# Patient Record
Sex: MALE | Race: WHITE | Employment: FULL TIME | URBAN - METROPOLITAN AREA
[De-identification: names, ages, dates, MRNs, and addresses within clinical notes are randomized per-mention and may not be internally consistent; named-entity substitution may affect disease eponyms.]

---

## 2022-01-24 ENCOUNTER — OFFICE VISIT (OUTPATIENT)
Dept: FAMILY MEDICINE CLINIC | Age: 47
End: 2022-01-24
Payer: COMMERCIAL

## 2022-01-24 ENCOUNTER — HOSPITAL ENCOUNTER (OUTPATIENT)
Dept: LAB | Age: 47
Discharge: HOME | End: 2022-01-24
Payer: COMMERCIAL

## 2022-01-24 VITALS
OXYGEN SATURATION: 95 % | HEART RATE: 86 BPM | WEIGHT: 315 LBS | HEIGHT: 69 IN | DIASTOLIC BLOOD PRESSURE: 79 MMHG | BODY MASS INDEX: 46.65 KG/M2 | RESPIRATION RATE: 20 BRPM | SYSTOLIC BLOOD PRESSURE: 125 MMHG | TEMPERATURE: 98.4 F

## 2022-01-24 DIAGNOSIS — R63.5 WEIGHT GAIN: ICD-10-CM

## 2022-01-24 DIAGNOSIS — G89.29 CHRONIC PAIN OF RIGHT KNEE: ICD-10-CM

## 2022-01-24 DIAGNOSIS — Z13.89 SCREENING FOR HEMATURIA OR PROTEINURIA: ICD-10-CM

## 2022-01-24 DIAGNOSIS — Z23 ENCOUNTER FOR IMMUNIZATION: Primary | ICD-10-CM

## 2022-01-24 DIAGNOSIS — F10.11 HISTORY OF ALCOHOL ABUSE: ICD-10-CM

## 2022-01-24 DIAGNOSIS — I10 HYPERTENSION, UNSPECIFIED TYPE: ICD-10-CM

## 2022-01-24 DIAGNOSIS — Z11.59 ENCOUNTER FOR HEPATITIS C SCREENING TEST FOR LOW RISK PATIENT: ICD-10-CM

## 2022-01-24 DIAGNOSIS — M25.561 CHRONIC PAIN OF RIGHT KNEE: ICD-10-CM

## 2022-01-24 DIAGNOSIS — Z83.3 FAMILY HISTORY OF DIABETES MELLITUS (DM): ICD-10-CM

## 2022-01-24 DIAGNOSIS — R00.2 FLUTTERING SENSATION OF HEART: ICD-10-CM

## 2022-01-24 DIAGNOSIS — E66.01 CLASS 3 SEVERE OBESITY DUE TO EXCESS CALORIES WITH SERIOUS COMORBIDITY AND BODY MASS INDEX (BMI) OF 45.0 TO 49.9 IN ADULT (HCC): ICD-10-CM

## 2022-01-24 DIAGNOSIS — K76.0 FATTY LIVER: ICD-10-CM

## 2022-01-24 DIAGNOSIS — K57.92 DIVERTICULITIS: ICD-10-CM

## 2022-01-24 LAB
ALBUMIN SERPL-MCNC: 4.2 G/DL (ref 3.4–5)
ALBUMIN/GLOB SERPL: 1.2 {RATIO} (ref 0.8–1.7)
ALP SERPL-CCNC: 55 U/L (ref 45–117)
ALT SERPL-CCNC: 37 U/L (ref 16–61)
ANION GAP SERPL CALC-SCNC: 6 MMOL/L (ref 3–18)
APPEARANCE UR: CLEAR
AST SERPL-CCNC: 17 U/L (ref 10–38)
BILIRUB SERPL-MCNC: 1 MG/DL (ref 0.2–1)
BILIRUB UR QL: NEGATIVE
BUN SERPL-MCNC: 12 MG/DL (ref 7–18)
BUN/CREAT SERPL: 13 (ref 12–20)
CALCIUM SERPL-MCNC: 9.1 MG/DL (ref 8.5–10.1)
CHLORIDE SERPL-SCNC: 104 MMOL/L (ref 100–111)
CHOLEST SERPL-MCNC: 141 MG/DL
CO2 SERPL-SCNC: 27 MMOL/L (ref 21–32)
COLOR UR: YELLOW
CREAT SERPL-MCNC: 0.91 MG/DL (ref 0.6–1.3)
EST. AVERAGE GLUCOSE BLD GHB EST-MCNC: 123 MG/DL
GLOBULIN SER CALC-MCNC: 3.6 G/DL (ref 2–4)
GLUCOSE SERPL-MCNC: 94 MG/DL (ref 74–99)
GLUCOSE UR STRIP.AUTO-MCNC: NEGATIVE MG/DL
HBA1C MFR BLD: 5.9 % (ref 4.2–5.6)
HDLC SERPL-MCNC: 39 MG/DL (ref 40–60)
HDLC SERPL: 3.6 {RATIO} (ref 0–5)
HGB UR QL STRIP: NEGATIVE
KETONES UR QL STRIP.AUTO: NEGATIVE MG/DL
LDLC SERPL CALC-MCNC: 85.6 MG/DL (ref 0–100)
LEUKOCYTE ESTERASE UR QL STRIP.AUTO: NEGATIVE
LIPASE SERPL-CCNC: 91 U/L (ref 73–393)
LIPID PROFILE,FLP: ABNORMAL
NITRITE UR QL STRIP.AUTO: NEGATIVE
PH UR STRIP: 6.5 [PH] (ref 5–8)
POTASSIUM SERPL-SCNC: 4.5 MMOL/L (ref 3.5–5.5)
PROT SERPL-MCNC: 7.8 G/DL (ref 6.4–8.2)
PROT UR STRIP-MCNC: NEGATIVE MG/DL
SODIUM SERPL-SCNC: 137 MMOL/L (ref 136–145)
SP GR UR REFRACTOMETRY: 1.01 (ref 1–1.03)
TRIGL SERPL-MCNC: 82 MG/DL (ref ?–150)
UROBILINOGEN UR QL STRIP.AUTO: 0.2 EU/DL (ref 0.2–1)
VLDLC SERPL CALC-MCNC: 16.4 MG/DL

## 2022-01-24 PROCEDURE — 99204 OFFICE O/P NEW MOD 45 MIN: CPT | Performed by: NURSE PRACTITIONER

## 2022-01-24 PROCEDURE — 81003 URINALYSIS AUTO W/O SCOPE: CPT

## 2022-01-24 PROCEDURE — 83036 HEMOGLOBIN GLYCOSYLATED A1C: CPT

## 2022-01-24 PROCEDURE — 93000 ELECTROCARDIOGRAM COMPLETE: CPT | Performed by: NURSE PRACTITIONER

## 2022-01-24 PROCEDURE — 80053 COMPREHEN METABOLIC PANEL: CPT

## 2022-01-24 PROCEDURE — 83690 ASSAY OF LIPASE: CPT

## 2022-01-24 PROCEDURE — 36415 COLL VENOUS BLD VENIPUNCTURE: CPT

## 2022-01-24 PROCEDURE — 86803 HEPATITIS C AB TEST: CPT

## 2022-01-24 PROCEDURE — 80061 LIPID PANEL: CPT

## 2022-01-24 RX ORDER — LANOLIN ALCOHOL/MO/W.PET/CERES
1000 CREAM (GRAM) TOPICAL DAILY
COMMUNITY

## 2022-01-24 RX ORDER — AMLODIPINE BESYLATE AND BENAZEPRIL HYDROCHLORIDE 10; 40 MG/1; MG/1
1 CAPSULE ORAL DAILY
Qty: 90 CAPSULE | Refills: 1 | Status: SHIPPED | OUTPATIENT
Start: 2022-01-24 | End: 2022-08-22 | Stop reason: SDUPTHER

## 2022-01-24 RX ORDER — CHOLECALCIFEROL TAB 125 MCG (5000 UNIT) 125 MCG
5000 TAB ORAL DAILY
COMMUNITY

## 2022-01-24 RX ORDER — VITAMIN E 1000 UNIT
1000 CAPSULE ORAL DAILY
COMMUNITY

## 2022-01-24 RX ORDER — AMLODIPINE BESYLATE AND BENAZEPRIL HYDROCHLORIDE 10; 40 MG/1; MG/1
1 CAPSULE ORAL DAILY
COMMUNITY
End: 2022-01-24 | Stop reason: SDUPTHER

## 2022-01-24 NOTE — PROGRESS NOTES
Patient tested positive for Covid on January 1, 2021. Patient states he took medication today. Skip Parr presents today for   Chief Complaint   Patient presents with   [facility] Establish Care    Knee Pain    Hypertension    Weight Gain       Is someone accompanying this pt? Yes Wife     Is the patient using any DME equipment during [address]? no    Depression Screening:  3 most recent PHQ Screens 1/24/2022   Little interest or pleasure in doing things Not at all   Feeling down, depressed, irritable, or hopeless Not at all   Total Score PHQ 2 0       Learning Assessment:  Learning Assessment 1/24/2022   PRIMARY LEARNER Patient   HIGHEST LEVEL OF EDUCATION - PRIMARY LEARNER  2 YEARS OF COLLEGE   BARRIERS PRIMARY LEARNER NONE   CO-LEARNER CAREGIVER No   PRIMARY LANGUAGE ENGLISH   LEARNER PREFERENCE PRIMARY DEMONSTRATION   ANSWERED BY Patient   RELATIONSHIP SELF       Health Maintenance reviewed and discussed and ordered per Provider. Health Maintenance Due   Topic Date Due    Hepatitis C Screening  Never done    DTaP/Tdap/Td series (1 - Tdap) Never done    Lipid Screen  Never done    COVID-19 Vaccine (2 - Booster for Anival series) 07/02/2021    Flu Vaccine (1) Never done   . Coordination of Care:  1. Have you been to the ER, urgent care clinic since your last visit? Hospitalized since your last visit? Yes December 9, 2021 Diverticulitis     2. Have you seen or consulted any other health care providers outside of the [address] [place] [address] since your last visit? Include any pap smears or colon screening.  no

## 2022-01-24 NOTE — PROGRESS NOTES
The Lizeth Solitario 42 y.o. male presents today for:    Chief Complaint   Patient presents with    Establish Care    Knee Pain    Hypertension    Weight Gain   Works as a . [employer]-right knee replacement    Pt states has a history of tachyarrhythmias; complains of intermittent fluttering in chest.   Pt has dependent edema. C/o right knee chronic pain s/p knee replacement. Left heel pain from plantar fascitis. Pt stopped drinking alcohol after recent ER visit for diverticulitis on 12-9; was drinking 15-20 beers on days off for the past several years. Patient had recent covid-19 infection on 1/1/2022. Review of Systems   Constitutional: Negative for chills, fever and weight loss. HENT: Negative. Eyes: Negative. Negative for blurred vision and double vision. Respiratory: Positive for cough. Negative for shortness of breath and wheezing. Cardiovascular: Positive for palpitations and leg swelling. Negative for chest pain. Fluttering    Dependent edema   Gastrointestinal: Positive for abdominal pain. Negative for blood in stool, constipation and diarrhea. Genitourinary: Negative. Negative for frequency and urgency. Musculoskeletal: Positive for joint pain. Negative for falls. Right knee pain    Left plantar fascitis    Skin: Negative. Neurological: Negative. Negative for dizziness and headaches. Endo/Heme/Allergies: Negative. Psychiatric/Behavioral: Positive for substance abuse. Negative for depression and suicidal ideas. 12/9-stopped alcohol consumption       Health Maintenance Due   Topic Date Due    COVID-19 Vaccine (2 - Booster for Anival series) 07/02/2021        Past Medical History:   Diagnosis Date    Hypertension        Physical Exam  Constitutional:       General: He is not in acute distress. Appearance: He is obese. He is not toxic-appearing. HENT:      Head: Normocephalic.    Cardiovascular:      Rate and Rhythm: Normal rate and regular rhythm. Pulses: Normal pulses. Heart sounds: Normal heart sounds. No murmur heard. Pulmonary:      Effort: Pulmonary effort is normal. No respiratory distress. Breath sounds: Normal breath sounds. No wheezing. Abdominal:      General: There is distension. Palpations: Abdomen is soft. There is no mass. Tenderness: There is no abdominal tenderness. There is no guarding or rebound. Hernia: No hernia is present. Musculoskeletal:      Cervical back: Normal range of motion. Right lower leg: Edema present. Left lower leg: No edema. Skin:     General: Skin is warm. Capillary Refill: Capillary refill takes less than 2 seconds. Findings: No rash. Neurological:      General: No focal deficit present. Mental Status: He is alert and oriented to person, place, and time. Cranial Nerves: No cranial nerve deficit. Motor: No weakness. Psychiatric:         Mood and Affect: Mood normal.        Visit Vitals  /79 (BP 1 Location: Left upper arm, BP Patient Position: Sitting)   Pulse 86   Temp 98.4 °F (36.9 °C) (Temporal)   Resp 20   Ht 5' 9\" (1.753 m)   Wt 336 lb 3.2 oz (152.5 kg)   SpO2 95%   BMI 49.65 kg/m²       Current Outpatient Medications:     cholecalciferol (Vitamin D3) (5000 Units/125 mcg) tab tablet, Take 5,000 Units by mouth daily. , Disp: , Rfl:     ascorbic acid, vitamin C, (Vitamin C) 1,000 mg tablet, Take 1,000 mg by mouth daily. , Disp: , Rfl:     MAGNESIUM GLYCINATE PO, Take 400 mg by mouth two (2) times a day., Disp: , Rfl:     cyanocobalamin (Vitamin B-12) 1,000 mcg tablet, Take 1,000 mcg by mouth daily. , Disp: , Rfl:     KRILL OIL PO, Take 1,000 mg by mouth daily. , Disp: , Rfl:     amLODIPine-benazepril (LOTREL) 10-40 mg per capsule, Take 1 Capsule by mouth daily. , Disp: 90 Capsule, Rfl: 1  ASSESSMENT and PLAN    ICD-10-CM ICD-9-CM    1.  Encounter for immunization  Z23 V03.89 THER/PROPH/DIAG INJECTION, SUBCUT/IM      CANCELED: INFLUENZA VIRUS VAC QUAD,SPLIT,PRESV FREE SYRINGE IM   2. Hypertension, unspecified type  T59 708.3 METABOLIC PANEL, COMPREHENSIVE   3. Weight gain  R63.5 783.1 LIPID PANEL   4. Fatty liver  K76.0 571.8    5. Diverticulitis  K57.92 562.11    6. Chronic pain of right knee  M25.561 719.46     G89.29 338.29    7. Family history of diabetes mellitus (DM)  Z83.3 V18.0    8. Class 3 severe obesity due to excess calories with serious comorbidity and body mass index (BMI) of 45.0 to 49.9 in adult (HCC)  E66.01 278.01 HEMOGLOBIN A1C WITH EAG    Z68.42 V85.42    9. Encounter for hepatitis C screening test for low risk patient  Z11.59 V73.89 HEPATITIS C AB   10. History of alcohol abuse  F10.11 305.03 LIPASE   11. Fluttering sensation of heart  R00.2 785.1 AMB POC EKG ROUTINE W/ 12 LEADS, INTER & REP      REFERRAL TO CARDIOLOGY   12. Screening for hematuria or proteinuria  Z13.89 V82.9 URINALYSIS W/ RFLX MICROSCOPIC     Follow-up and Dispositions    · Return in about 1 month (around 2/24/2022).        EKG normal. Pt requesting cardiology referral for cardiac stress test, possible event monitor for fluttering in chest.    lab results and schedule of future lab studies reviewed with patient  reviewed diet, exercise and weight control  cardiovascular risk and specific lipid/LDL goals reviewed  reviewed medications and side effects in detail    Nguyễn Ruiz NP

## 2022-01-25 LAB
HCV AB SER IA-ACNC: 0.05 INDEX
HCV AB SERPL QL IA: NEGATIVE
HCV COMMENT,HCGAC: NORMAL

## 2022-02-04 ENCOUNTER — TELEPHONE (OUTPATIENT)
Dept: FAMILY MEDICINE CLINIC | Age: 47
End: 2022-02-04

## 2022-02-04 DIAGNOSIS — R00.2 FLUTTERING SENSATION OF HEART: Primary | ICD-10-CM

## 2022-02-28 ENCOUNTER — OFFICE VISIT (OUTPATIENT)
Dept: FAMILY MEDICINE CLINIC | Age: 47
End: 2022-02-28
Payer: COMMERCIAL

## 2022-02-28 VITALS
TEMPERATURE: 98.5 F | BODY MASS INDEX: 46.65 KG/M2 | RESPIRATION RATE: 20 BRPM | OXYGEN SATURATION: 96 % | HEART RATE: 84 BPM | SYSTOLIC BLOOD PRESSURE: 130 MMHG | WEIGHT: 315 LBS | HEIGHT: 69 IN | DIASTOLIC BLOOD PRESSURE: 71 MMHG

## 2022-02-28 DIAGNOSIS — E66.01 CLASS 3 SEVERE OBESITY DUE TO EXCESS CALORIES WITH SERIOUS COMORBIDITY AND BODY MASS INDEX (BMI) OF 45.0 TO 49.9 IN ADULT (HCC): ICD-10-CM

## 2022-02-28 DIAGNOSIS — K57.92 DIVERTICULITIS: ICD-10-CM

## 2022-02-28 DIAGNOSIS — K76.0 FATTY LIVER: ICD-10-CM

## 2022-02-28 DIAGNOSIS — F10.11 HISTORY OF ALCOHOL ABUSE: ICD-10-CM

## 2022-02-28 DIAGNOSIS — R63.5 WEIGHT GAIN: Primary | ICD-10-CM

## 2022-02-28 DIAGNOSIS — R73.03 PREDIABETES: ICD-10-CM

## 2022-02-28 PROCEDURE — 99214 OFFICE O/P EST MOD 30 MIN: CPT | Performed by: NURSE PRACTITIONER

## 2022-02-28 NOTE — PROGRESS NOTES
The Skip Parr 42 y.o. male presents today for:    Chief Complaint   Patient presents with    Follow-up     1 month     Hypertension     Saw GI doctor; additional labs ordered. Reviewed labs in detail with patient. Pt denies any further palpitations since last visit. Colonoscopy scheduled for tomorrow    Liver scan at end of March     Stress test 3/31    Review of Systems   Constitutional: Negative. Negative for chills, fever and weight loss. HENT: Negative. Eyes: Negative. Negative for blurred vision and double vision. Respiratory: Negative for cough, shortness of breath and wheezing. Cardiovascular: Negative for chest pain, palpitations and leg swelling. Gastrointestinal: Positive for abdominal pain. Negative for blood in stool, constipation and diarrhea. Genitourinary: Negative. Negative for frequency and urgency. Musculoskeletal: Positive for joint pain. Negative for falls. Right knee pain    Left plantar fascitis    Skin: Negative. Neurological: Negative. Negative for dizziness and headaches. Endo/Heme/Allergies: Negative. Psychiatric/Behavioral: Negative for depression, substance abuse and suicidal ideas. 12/9-stopped alcohol consumption     Visit Vitals  /71 (BP 1 Location: Left upper arm, BP Patient Position: Sitting)   Pulse 84   Temp 98.5 °F (36.9 °C) (Temporal)   Resp 20   Ht 5' 9\" (1.753 m)   Wt 341 lb (154.7 kg)   SpO2 96%   BMI 50.36 kg/m²       Current Outpatient Medications:     cholecalciferol (Vitamin D3) (5000 Units/125 mcg) tab tablet, Take 5,000 Units by mouth daily. , Disp: , Rfl:     ascorbic acid, vitamin C, (Vitamin C) 1,000 mg tablet, Take 1,000 mg by mouth daily. , Disp: , Rfl:     MAGNESIUM GLYCINATE PO, Take 400 mg by mouth two (2) times a day., Disp: , Rfl:     cyanocobalamin (Vitamin B-12) 1,000 mcg tablet, Take 1,000 mcg by mouth daily. , Disp: , Rfl:     KRILL OIL PO, Take 1,000 mg by mouth daily. , Disp: , Rfl:    amLODIPine-benazepril (LOTREL) 10-40 mg per capsule, Take 1 Capsule by mouth daily. , Disp: 90 Capsule, Rfl: 1  Health Maintenance Due   Topic Date Due    Pneumococcal 0-64 years (1 of 2 - PPSV23) Never done        Past Medical History:   Diagnosis Date    Hypertension        Physical Exam  Constitutional:       General: He is not in acute distress. Appearance: He is obese. He is not toxic-appearing. Cardiovascular:      Rate and Rhythm: Normal rate and regular rhythm. Pulses: Normal pulses. Heart sounds: Normal heart sounds. No murmur heard. Pulmonary:      Effort: Pulmonary effort is normal.      Breath sounds: Normal breath sounds. Abdominal:      Palpations: Abdomen is soft. Musculoskeletal:         General: Normal range of motion. Skin:     General: Skin is warm. Capillary Refill: Capillary refill takes less than 2 seconds. Neurological:      General: No focal deficit present. Mental Status: He is alert and oriented to person, place, and time. Psychiatric:         Mood and Affect: Mood normal.        Visit Vitals  /71 (BP 1 Location: Left upper arm, BP Patient Position: Sitting)   Pulse 84   Temp 98.5 °F (36.9 °C) (Temporal)   Resp 20   Ht 5' 9\" (1.753 m)   Wt 341 lb (154.7 kg)   SpO2 96%   BMI 50.36 kg/m²       Current Outpatient Medications:     cholecalciferol (Vitamin D3) (5000 Units/125 mcg) tab tablet, Take 5,000 Units by mouth daily. , Disp: , Rfl:     ascorbic acid, vitamin C, (Vitamin C) 1,000 mg tablet, Take 1,000 mg by mouth daily. , Disp: , Rfl:     MAGNESIUM GLYCINATE PO, Take 400 mg by mouth two (2) times a day., Disp: , Rfl:     cyanocobalamin (Vitamin B-12) 1,000 mcg tablet, Take 1,000 mcg by mouth daily. , Disp: , Rfl:     KRILL OIL PO, Take 1,000 mg by mouth daily. , Disp: , Rfl:     amLODIPine-benazepril (LOTREL) 10-40 mg per capsule, Take 1 Capsule by mouth daily. , Disp: 90 Capsule, Rfl: 1       ASSESSMENT and PLAN    ICD-10-CM ICD-9-CM    1. Weight gain  R63.5 783.1 REFERRAL TO DIETITIAN   2. Fatty liver  K76.0 571.8 REFERRAL TO DIETITIAN   3. Class 3 severe obesity due to excess calories with serious comorbidity and body mass index (BMI) of 45.0 to 49.9 in adult (HCC)  E66.01 278.01     Z68.42 V85.42    4. Diverticulitis  K57.92 562.11 REFERRAL TO DIETITIAN   5. Prediabetes  R73.03 790.29    6. History of alcohol abuse  F10.11 305.03      Follow-up and Dispositions    · Return in about 3 months (around 5/28/2022).        lab results and schedule of future lab studies reviewed with patient  reviewed diet, exercise and weight control  cardiovascular risk and specific lipid/LDL goals reviewed  reviewed medications and side effects in detail    Yoshi Salamanca NP

## 2022-02-28 NOTE — PROGRESS NOTES
Patient states he took medication today. Raul Preciado presents today for   Chief Complaint   Patient presents with    Follow-up     1 month     Hypertension       Is someone accompanying this pt? Yes Wife     Is the patient using any DME equipment during OV? No     Depression Screening:  3 most recent PHQ Screens 2/28/2022   Little interest or pleasure in doing things Not at all   Feeling down, depressed, irritable, or hopeless Not at all   Total Score PHQ 2 0       Learning Assessment:  Learning Assessment 1/24/2022   PRIMARY LEARNER Patient   HIGHEST LEVEL OF EDUCATION - PRIMARY LEARNER  2 YEARS OF COLLEGE   BARRIERS PRIMARY LEARNER NONE   CO-LEARNER CAREGIVER No   PRIMARY LANGUAGE ENGLISH   LEARNER PREFERENCE PRIMARY DEMONSTRATION   ANSWERED BY Patient   RELATIONSHIP SELF       Health Maintenance reviewed and discussed and ordered per Provider. Health Maintenance Due   Topic Date Due    Pneumococcal 0-64 years (1 of 2 - PPSV23) Never done   . Coordination of Care:  1. Have you been to the ER, urgent care clinic since your last visit? Hospitalized since your last visit? no    2. Have you seen or consulted any other health care providers outside of the [address] [place] [address] since your last visit? Include any pap smears or colon screening. Yes GI February 2022.          3. For patients aged 39-70: Has the patient had a colonoscopy / FIT/ Cologuard? NA - based on age      If the patient is female:    4. For patients aged 41-77: Has the patient had a mammogram within the past 2 years? NA - based on age or sex      11. For patients aged 21-65: Has the patient had a pap smear?  NA - based on age or sex

## 2022-03-22 ENCOUNTER — HOSPITAL ENCOUNTER (OUTPATIENT)
Dept: LAB | Age: 47
Discharge: HOME | End: 2022-03-22
Payer: COMMERCIAL

## 2022-03-22 ENCOUNTER — OFFICE VISIT (OUTPATIENT)
Dept: CARDIOLOGY CLINIC | Age: 47
End: 2022-03-22
Payer: COMMERCIAL

## 2022-03-22 VITALS
HEART RATE: 90 BPM | TEMPERATURE: 98.1 F | WEIGHT: 315 LBS | DIASTOLIC BLOOD PRESSURE: 69 MMHG | OXYGEN SATURATION: 97 % | SYSTOLIC BLOOD PRESSURE: 129 MMHG | BODY MASS INDEX: 49.18 KG/M2 | RESPIRATION RATE: 16 BRPM

## 2022-03-22 DIAGNOSIS — R00.2 PALPITATIONS: Primary | ICD-10-CM

## 2022-03-22 DIAGNOSIS — R00.2 PALPITATIONS: ICD-10-CM

## 2022-03-22 LAB — TSH SERPL DL<=0.05 MIU/L-ACNC: 1.22 UIU/ML (ref 0.36–3.74)

## 2022-03-22 PROCEDURE — 36415 COLL VENOUS BLD VENIPUNCTURE: CPT

## 2022-03-22 PROCEDURE — 84443 ASSAY THYROID STIM HORMONE: CPT

## 2022-03-22 PROCEDURE — 99204 OFFICE O/P NEW MOD 45 MIN: CPT | Performed by: INTERNAL MEDICINE

## 2022-03-22 NOTE — PROGRESS NOTES
Identified pt with two pt identifiers(name and ). Reviewed record in preparation for visit and have obtained necessary documentation. Ovi Hahn presents today for   Chief Complaint   Patient presents with    New Patient     flutters       Pt denies DIZZINESS, SOB, CHEST PAIN/ PRESSURE, FATIGUE/WEAKNESS, HEADACHES, SWELLING. Ovi Hahn preferred language for health care discussion is english/other. Personal Protective Equipment:   Personal Protective Equipment was used including: mask-surgical and hands-gloves. Patient was placed on no precaution(s). Patient was masked. Precautions:   Patient currently on None  Patient currently roomed with door closed. Is someone accompanying this pt? no    Is the patient using any DME equipment during [address]? no    Depression Screening:  3 most recent PHQ Screens 3/22/2022   Little interest or pleasure in doing things Not at all   Feeling down, depressed, irritable, or hopeless Not at all   Total Score PHQ 2 0       Learning Assessment:  Learning Assessment 2022   PRIMARY LEARNER Patient   HIGHEST LEVEL OF EDUCATION - PRIMARY LEARNER  2 YEARS [facility] PRIMARY LEARNER NONE   CO-LEARNER CAREGIVER No   PRIMARY LANGUAGE ENGLISH   LEARNER PREFERENCE PRIMARY DEMONSTRATION   ANSWERED BY Patient   RELATIONSHIP SELF       Abuse Screening:  Abuse Screening Questionnaire 2022   Do you ever feel afraid of your partner? N   Are you in a relationship with someone who physically or mentally threatens you? N   Is it safe for you to go home? Y       Fall Risk  No flowsheet data found. Pt currently taking Anticoagulant therapy? No  Pt currently taking Antiplatelet therapy? no  Coordination of Care:  1. Have you been to the ER, urgent care clinic since your last visit? Hospitalized since your last visit? no    2. Have you seen or consulted any other health care providers outside of the [address] [place] [address] [place] since your last visit?  Include any pap smears or colon screening. no      Please see Red banners under Allergies and Med Rec to remove outside inquires. All correct information has been verified with patient and added to chart.      Medication's patient's would liked removed has been marked not taking to be removed per Verbal order and read back per Eugenio Lowe MD

## 2022-03-22 NOTE — PROGRESS NOTES
[facility]    81-year-old male with history of hypertension, obesity    Patient is here today for cardiac evaluation. He denies any prior history of MI or CHF. Over last 12 months patient has been having random episode of fluttering sensation in the chest.  This last usually less than 30 seconds. He is still very active and does not complain of any chest pain or chest tightness with exertion. No nausea vomiting or diaphoresis. Occasionally he feels dizzy with this fluttering sensation. He perform exercise regularly for his work. He swim 1000 m without any symptoms. He can ride bike 5 to 6 miles without any symptoms. Denies any lower extremity swelling or PND  Denies any nausea, vomiting, abdominal pain, fever, chills, sputum production. No hematuria or other bleeding complaints    Past Medical History:   Diagnosis Date    Diverticulitis     Hypertension     Kidney stone     Obesity        Review of Systems:  Cardiac symptoms as noted above in HPI. All others negative. Denies fatigue, malaise, skin rash, joint pain, blurring vision, photophobia, neck pain, hemoptysis, chronic cough, nausea, vomiting, hematuria, burning micturition, BRBPR, chronic headaches. Current Outpatient Medications   Medication Sig    MAGNESIUM GLYCINATE PO Take 400 mg by mouth two (2) times a day.  KRILL OIL PO Take 1,000 mg by mouth daily.  amLODIPine-benazepril (LOTREL) 10-40 mg per capsule Take 1 Capsule by mouth daily.  cholecalciferol (Vitamin D3) (5000 Units/125 mcg) tab tablet Take 5,000 Units by mouth daily.  ascorbic acid, vitamin C, (Vitamin C) 1,000 mg tablet Take 1,000 mg by mouth daily.  cyanocobalamin (Vitamin B-12) 1,000 mcg tablet Take 1,000 mcg by mouth daily. No current facility-administered medications for this visit.        Past Surgical History:   Procedure Laterality Date    HX ACL RECONSTRUCTION  1995    HX LITHOTRIPSY      HX SHOULDER ARTHROSCOPY  2007       Allergies and Sensitivities:  Allergies   Allergen Reactions    Egg Hives       Family History:  Family History   Problem Relation Age of Onset    Thyroid Disease Mother        Social History:  Social History     Tobacco Use    Smoking status: Never Smoker    Smokeless tobacco: Never Used   Vaping Use    Vaping Use: Never used   Substance Use Topics    Alcohol use: Yes    Drug use: Not Currently     He  reports that he has never smoked. He has never used smokeless tobacco.  He  reports current alcohol use. Physical Exam:  BP Readings from Last 3 Encounters:   03/22/22 129/69   02/28/22 130/71   01/24/22 125/79         Pulse Readings from Last 3 Encounters:   03/22/22 90   02/28/22 84   01/24/22 86          Wt Readings from Last 3 Encounters:   03/22/22 151 kg (333 lb)   02/28/22 154.7 kg (341 lb)   01/24/22 152.5 kg (336 lb 3.2 oz)       Constitutional: Oriented to person, place, and time. HENT: Head: Normocephalic and atraumatic. Eyes: Conjunctivae and extraocular motions are normal.   Neck: No JVD present. Carotid bruit is not appreciated. Cardiovascular: Regular rhythm. No murmur, gallop or rubs appreciated  Lung: Breath sounds normal. No respiratory distress. No ronchi or rales appreciated  Abdominal: No tenderness. No rebound and no guarding. Musculoskeletal: There is no lower extremity edema. No cynosis  Lymphadenopathy:  No cervical or supraclavicular adenopathy appriciated. Neurological: No gross motor deficit noted. Skin: No visible skin rash noted. No Ear discharge noted  Psychiatric: Normal mood and affect.      LABS:   @No results found for: WBC, WBCLT, HGBPOC, HGB, HGBP, HCTPOC, HCT, PHCT, RBCH, PLT, MCV, HGBEXT, HCTEXT, PLTEXT  Lab Results   Component Value Date/Time    Sodium 137 01/24/2022 10:07 AM    Potassium 4.5 01/24/2022 10:07 AM    Chloride 104 01/24/2022 10:07 AM    CO2 27 01/24/2022 10:07 AM    Glucose 94 01/24/2022 10:07 AM    BUN 12 2022 10:07 AM    Creatinine 0.91 2022 10:07 AM     Lipids Latest Ref Rng & Units 2022   Chol, Total <200 MG/   HDL 40 - 60 MG/DL 39(L)   LDL 0 - 100 MG/DL 85.6   Trig <150 MG/DL 82   Chol/HDL Ratio 0 - 5.0   3.6   Some recent data might be hidden     Lab Results   Component Value Date/Time    ALT (SGPT) 37 2022 10:07 AM     Lab Results   Component Value Date/Time    Hemoglobin A1c 5.9 (H) 2022 10:07 AM     No results found for: TSH, TSH2, TSH3, TSHP, TSHEXT    EK2022: Sinus rhythm at 83 bpm.  No ST changes of ischemia. Normal WV and QRS interval    STRESS TEST (EST, PHARM, NUC, ECHO etc)    CATHETERIZATION    IMPRESSION & PLAN:  81-year-old male with hypertension obesity    Hypertension:  Currently on amlodipine and benazepril. /69. Echo ordered to rule out hypertensive cardiovascular heart disease especially in the setting of some palpitation and atypical chest pain    Palpitation/chest discomfort:  Based on history, seems like patient is having fluttering and palpitation. Do not suspect angina. He is very active and able to perform exercise without any chest pain or angina symptoms  Event monitor for 30 days. We will check TSH  We will proceed with echo to rule out any hypertensive cardiovascular heart disease or to abnormal cardiac structure    Importance of diet and exercise was discussed with patient. This plan was discussed with patient who is in agreement. Thank you for allowing me to participate in patient care. Please feel free to call me if you have any question or concern. Sae Hodge MD  Please note: This document has been produced using voice recognition software. Unrecognized errors in transcription may be present.

## 2022-03-22 NOTE — LETTER
3/22/2022    Patient: Keshav Xiong   YOB: 1975   Date of Visit: 3/22/2022     Stephanie Forman, [address] [place] [id number]  [address] [place]    Dear Stephanie Forman, NP,      Thank you for referring Mr. Keshav Xiong to [address] [place] [address] for evaluation. My notes for this consultation are attached. If you have questions, please do not hesitate to call me. I look forward to following your patient along with you.       Sincerely,    Cyndi Rodriges MD

## 2022-03-22 NOTE — PATIENT INSTRUCTIONS
Testing   Echo     call office 3-5 days after testing is completed for results**     Labs  TSH      No appointment required for lab work  [address] [facility] [address] [facility]  Hours are Mon-Fri 7:00 am-3:30 pm  **closed from 12:30 pm-1pm daily**    Other testing  [employer]-will be calling you to confirm your address to send your Heart Monitor. Please allow 7-10 business days for monitor to arrive at your home.   Customer Service for [address]:  [phone]

## 2022-03-31 ENCOUNTER — TELEPHONE (OUTPATIENT)
Dept: FAMILY MEDICINE CLINIC | Age: 47
End: 2022-03-31

## 2022-03-31 ENCOUNTER — HOSPITAL ENCOUNTER (OUTPATIENT)
Dept: NUTRITION | Age: 47
Discharge: HOME | End: 2022-03-31
Payer: COMMERCIAL

## 2022-03-31 PROCEDURE — 97802 MEDICAL NUTRITION INDIV IN: CPT

## 2022-03-31 NOTE — TELEPHONE ENCOUNTER
Spoke to JUAN) at [facility], she states patient nuclear stress test came back abnormal. She will fax over results.

## 2022-04-08 ENCOUNTER — PATIENT MESSAGE (OUTPATIENT)
Dept: FAMILY MEDICINE CLINIC | Age: 47
End: 2022-04-08

## 2022-04-11 ENCOUNTER — TELEPHONE (OUTPATIENT)
Dept: FAMILY MEDICINE CLINIC | Age: 47
End: 2022-04-11

## 2022-04-11 PROBLEM — M25.562 PAIN IN LEFT KNEE: Status: ACTIVE | Noted: 2019-02-04

## 2022-04-11 PROBLEM — M66.821: Status: ACTIVE | Noted: 2019-02-04

## 2022-04-11 NOTE — TELEPHONE ENCOUNTER
Left voicemail regarding abnormal stress test results. Notified Dr. Macy Stevens via Epic messaging regarding abnormal stress test results as well.

## 2022-04-11 NOTE — TELEPHONE ENCOUNTER
Patient returned call to discuss stress test results
Spoke with patient and wife regarding abnormal stress test results. Pt is wearing an event monitor by cardiology; has echocardiogram scheduled next week and follow up with Dr. Bismark Patricio on 4-20. Discussed that the abnormal result message was sent to Dr. Bismark Patricio as well. Pt is doing better since his micro perforation from diverticulitis. Pt will follow up with GI; had recent normal colonoscopy 4 weeks prior.
no

## 2022-04-19 ENCOUNTER — TELEPHONE (OUTPATIENT)
Dept: CARDIOLOGY CLINIC | Age: 47
End: 2022-04-19

## 2022-04-19 LAB — SARS-COV-2, NAA: NOT DETECTED

## 2022-04-19 NOTE — PROGRESS NOTES
[facility] AT [place] - Outpatient Nutrition Services  [address] [place] [address] Scott Greenberg [address], [address] \[facility]\[address]  Phone: [phone] Fax: [phone]   Nutrition Assessment - Medical Nutrition Therapy   Outpatient Initial Evaluation         Patient Name: Starla Mendez : 1975   Treatment Diagnosis: Weight gain; fatty liver; diverticulitis   Referral Source: Coy Rhodes NP Start of Care [facility]): 2022     Gender: male Age: 42 y.o. Ht: 68.5 In Wt: 324 lb  kg   BMI: 48.5 BMR   Male 2342 BMR Female      Past Medical History:  HTN     Pertinent Medications:   n/a     Biochemical Data:   Lab Results   Component Value Date/Time    Hemoglobin A1c 5.9 (H) 2022 10:07 AM     Lab Results   Component Value Date/Time    Sodium 137 2022 10:07 AM    Potassium 4.5 2022 10:07 AM    Chloride 104 2022 10:07 AM    CO2 27 2022 10:07 AM    Anion gap 6 2022 10:07 AM    Glucose 94 2022 10:07 AM    BUN 12 2022 10:07 AM    Creatinine 0.91 2022 10:07 AM    BUN/Creatinine ratio 13 2022 10:07 AM    GFR est AA >60 2022 10:07 AM    GFR est non-AA >60 2022 10:07 AM    Calcium 9.1 2022 10:07 AM    Bilirubin, total 1.0 2022 10:07 AM    Alk. phosphatase 55 2022 10:07 AM    Protein, total 7.8 2022 10:07 AM    Albumin 4.2 2022 10:07 AM    Globulin 3.6 2022 10:07 AM    A-G Ratio 1.2 2022 10:07 AM    ALT (SGPT) 37 2022 10:07 AM    AST (SGOT) 17 2022 10:07 AM     Lab Results   Component Value Date/Time    Cholesterol, total 141 2022 10:07 AM    HDL Cholesterol 39 (L) 2022 10:07 AM    LDL, calculated 85.6 2022 10:07 AM    VLDL, calculated 16.4 2022 10:07 AM    Triglyceride 82 2022 10:07 AM    CHOL/HDL Ratio 3.6 2022 10:07 AM     Lab Results   Component Value Date/Time    ALT (SGPT) 37 2022 10:07 AM    AST (SGOT) 17 2022 10:07 AM    Alk.  phosphatase 55 2022 10:07 AM Bilirubin, total 1.0 01/24/2022 10:07 AM     Lab Results   Component Value Date/Time    Creatinine 0.91 01/24/2022 10:07 AM     Lab Results   Component Value Date/Time    BUN 12 01/24/2022 10:07 AM     No results found for: MCACR, MCA1, MCA2, MCA3, MCAU, MCAU2, MCALPOCT     Assessment:    Pt referred to nutrition counseling for assistance with weight gain, fatty liver and diverticulitis. Pt accompanied by his wife, who also requested nutrition counseling. Pt reports 80 lb weight gain since he joined [employer] in 2000. Pt has lost almost 20 lbs on his own. Pt c/o joint pain, which limits his ability to exercise, but pt reports activity (outside of job) 1-4d/wk. Pt planning to retire from [employer] in 4 years. Pt highly motivated to improve his health and wellness and agreed to all goals and recommendations provided.  lbs (to begin). Food & Nutrition: B- 2 pkts oatmeal or cream of wheat  L- leftovers or salad with pork/ckn  D- meat + starch + veg  Sn- keto bar or oat yogurt + pretzels    Pt does not drink alcohol (as of Dec 4) and admitted he abused alcohol as a pain reliever for 20 years. Pt's current diet low in protein and inconsistent in both meal timing and macronutrient composition. Estimate Needs   Calories: 1900  Protein: 143 Carbs: 190 Fat: 63   Kcal/day  g/day  g/day  g/day        percent: 30  40  30               Nutrition Diagnosis Nutrition Diagnosis: Obesity related to increased PO intake and lack of activity as evidenced by high BMI and unhealthy diet and exercise recall. Nutrition Intervention &  Education: Provided macronutrient education, including optimal times to eat throughout the day and appropriate balance of macronutrients to promote more efficient RMR. Discussed the importance of developing a consistent lifestyle, including eating habits for long term weight loss and management. Provided pt with meal builder and diet plan.      Handouts Provided: []  Carbohydrates  [] Protein  []  Non-starchy Vegatbles  []  Food Label  []  Meal and Snack Ideas  []  Food Journals []  Diabetes  []  Cholesterol  []  Sodium  [x]  Meal Builder  [x]  Diet Plan  []  Others:   Information Reviewed with: Pt and pt's wife   Readiness to Change Stage: []  Pre-contemplative    []  Contemplative  [x]  Preparation               []  Action                  []  Maintenance   Potential Barriers to Learning: []  Decline in memory    []  Language barrier   []  Other:  []  Emotional                  []  Limited mobility  []  Lack of motivation     [] Vision, hearing or cognitive impairment   Expected Compliance: Good      Nutritional Goal - To promote lifestyle changes to result in:    [x]  Weight loss  []  Improved diabetic control  []  Decreased cholesterol levels  []  Decreased blood pressure  []  Weight maintenance []  Preventing any interactions associated with food allergies  []  Adequate weight gain toward goal weight  []  Other:        Patient Goals:   1. Balance carb and protein at every meal/snack  2.  Eat 2-3 hrs after snacks and 4-5 hrs after meals     Dietitian Signature: Lizy Ruff MS, RD Date: 4/19/2022   Follow-up: 4/28 Time: 11:42 AM

## 2022-04-28 ENCOUNTER — HOSPITAL ENCOUNTER (OUTPATIENT)
Dept: NUTRITION | Age: 47
Discharge: HOME | End: 2022-04-28
Payer: COMMERCIAL

## 2022-04-28 PROCEDURE — 97803 MED NUTRITION INDIV SUBSEQ: CPT

## 2022-05-03 NOTE — PROGRESS NOTES
NUTRITION  FOLLOW-UP TREATMENT NOTE  Patient Name: Alejandro Calderon         Date: 5/3/2022  : 1975    YES/NO Patient  Verified  Diagnosis: weight gain   In time:   200             Out time:   230   Total Treatment Time (min):   30     SUBJECTIVE/ASSESSMENT    Current Wt: 316 Previous Wt: 324 Wt Change: -8     Initial Wt:  Total Wt change:        Changes in medication or medical history? Any new allergies, surgeries or procedures? YES/   If yes, update Summary List   Pt suffered perforated colon and was hospitalized 5d. Pt fully recovered and to f/u with surgeon to discuss resection. Nutrition Diagnosis        Diagnosis Status: Nutrition Diagnosis: Obesity related to increased PO intake and lack of activity as evidenced by high BMI and unhealthy diet and exercise recall. [x]  Improved []  No Change    []  Declined        Nutrition Monitoring and Evaluation: Pt has not had the opportunity to follow prescribed meal plan R/T hospitalization for perforated colon a few days after initial nutrition consult. Pt concerned about nutrition guidelines for managing diverticulitis, but was told by GI that perforation will likely occur again. Discussed general nutrition guidelines with pt for managing diet during inflammatory occasions as well as maintenance. Pt prefers to discuss surgical options with GI surgeon to prevent another infection leading to perforation. Patient Goals 1. Limit to 40-50g total carb at each meal  2.  During inflammation: follow low residue diet (low fiber); maintenance: normal/high fiber diet       PLAN    [x]  Continue on current plan []  Follow-up PRN   []  Discharge due to :    [x]  Next appt: PRN     Dietitian: Jennings Peabody, MS, RD    Date: 5/3/2022 Time: 9:42 AM

## 2022-05-26 ENCOUNTER — OFFICE VISIT (OUTPATIENT)
Dept: FAMILY MEDICINE CLINIC | Age: 47
End: 2022-05-26
Payer: COMMERCIAL

## 2022-05-26 VITALS
TEMPERATURE: 98.4 F | OXYGEN SATURATION: 97 % | DIASTOLIC BLOOD PRESSURE: 74 MMHG | WEIGHT: 315 LBS | RESPIRATION RATE: 16 BRPM | HEIGHT: 69 IN | HEART RATE: 101 BPM | BODY MASS INDEX: 46.65 KG/M2 | SYSTOLIC BLOOD PRESSURE: 122 MMHG

## 2022-05-26 DIAGNOSIS — R79.89 ELEVATED PLATELET COUNT: ICD-10-CM

## 2022-05-26 DIAGNOSIS — I10 HYPERTENSION, UNSPECIFIED TYPE: ICD-10-CM

## 2022-05-26 DIAGNOSIS — K76.0 FATTY LIVER: ICD-10-CM

## 2022-05-26 DIAGNOSIS — R73.03 PREDIABETES: ICD-10-CM

## 2022-05-26 DIAGNOSIS — R00.2 FLUTTERING SENSATION OF HEART: ICD-10-CM

## 2022-05-26 DIAGNOSIS — K57.92 DIVERTICULITIS: Primary | ICD-10-CM

## 2022-05-26 PROBLEM — K57.80 PERFORATED DIVERTICULUM: Status: ACTIVE | Noted: 2022-04-03

## 2022-05-26 PROCEDURE — 99214 OFFICE O/P EST MOD 30 MIN: CPT | Performed by: NURSE PRACTITIONER

## 2022-05-26 NOTE — PROGRESS NOTES
The Aliza Coburn 42 y.o. male presents today for:    Chief Complaint   Patient presents with    Follow-up     3 month      Patient seeing GI for history of diverticulitis and perforated diverticulum  --had fibroscan with GI     Saw surgeon 5/2022  --considering surgery for diverticulitis     Dr. Vinay Loyola stress test  --echo and heart monitor  --follow up 7/2022    Swimming but not as much due to  schedule  --meeting with dietician for weight loss   --lost almost 20 pounds  --goal to be less than 300 at next visit     Review of Systems   Constitutional: Negative. Negative for chills, fever and weight loss. HENT: Negative. Eyes: Negative. Negative for blurred vision and double vision. Respiratory: Negative for cough, shortness of breath and wheezing. Cardiovascular: Positive for palpitations. Negative for chest pain and leg swelling. Occasional palpitations   Gastrointestinal: Negative for abdominal pain, blood in stool, constipation and diarrhea. Genitourinary: Negative. Negative for frequency and urgency. Musculoskeletal: Positive for back pain and joint pain. Negative for falls. Right knee pain    Left plantar fascitis    Skin: Negative. Neurological: Negative. Negative for dizziness and headaches. Endo/Heme/Allergies: Negative. Psychiatric/Behavioral: Negative for depression, substance abuse and suicidal ideas. 12/9-stopped alcohol consumption       Health Maintenance Due   Topic Date Due    Pneumococcal 0-64 years (1 - PCV) Never done        Past Medical History:   Diagnosis Date    Diverticulitis     Hypertension     Kidney stone     Obesity      Visit Vitals  /74 (BP 1 Location: Right arm, BP Patient Position: Sitting)   Pulse (!) 101   Temp 98.4 °F (36.9 °C) (Temporal)   Resp 16   Ht 5' 9\" (1.753 m)   Wt 319 lb 3.2 oz (144.8 kg)   SpO2 97%   BMI 47.14 kg/m²       Current Outpatient Medications:     OTHER, daily.  Joint supplement, Disp: , Rfl:     cholecalciferol (Vitamin D3) (5000 Units/125 mcg) tab tablet, Take 5,000 Units by mouth daily. , Disp: , Rfl:     ascorbic acid, vitamin C, (Vitamin C) 1,000 mg tablet, Take 1,000 mg by mouth daily. , Disp: , Rfl:     MAGNESIUM GLYCINATE PO, Take 400 mg by mouth two (2) times a day., Disp: , Rfl:     cyanocobalamin (Vitamin B-12) 1,000 mcg tablet, Take 1,000 mcg by mouth daily. , Disp: , Rfl:     KRILL OIL PO, Take 1,000 mg by mouth daily. , Disp: , Rfl:     amLODIPine-benazepril (LOTREL) 10-40 mg per capsule, Take 1 Capsule by mouth daily. , Disp: 90 Capsule, Rfl: 1    Physical Exam  Constitutional:       General: He is not in acute distress. Appearance: He is obese. He is not toxic-appearing. Cardiovascular:      Rate and Rhythm: Normal rate and regular rhythm. Pulses: Normal pulses. Heart sounds: Normal heart sounds. No murmur heard. Pulmonary:      Effort: Pulmonary effort is normal.      Breath sounds: Normal breath sounds. Abdominal:      Palpations: Abdomen is soft. Musculoskeletal:         General: Normal range of motion. Skin:     General: Skin is warm. Capillary Refill: Capillary refill takes less than 2 seconds. Neurological:      General: No focal deficit present. Mental Status: He is alert and oriented to person, place, and time. Psychiatric:         Mood and Affect: Mood normal.       ASSESSMENT and PLAN    ICD-10-CM ICD-9-CM    1. Diverticulitis  K57.92 562.11    2. Fatty liver  K76.0 571.8    3. Hypertension, unspecified type  I10 401.9    4. Prediabetes  R73.03 790.29    5. Fluttering sensation of heart  R00.2 785.1    6. Elevated platelet count  R13.26 790.6      Follow-up and Dispositions    · Return in about 3 months (around 8/26/2022) for with labs 1 week prior.        lab results and schedule of future lab studies reviewed with patient  reviewed diet, exercise and weight control  cardiovascular risk and specific lipid/LDL goals reviewed  reviewed medications and side effects in detail  radiology results and schedule of future radiology studies reviewed with patient    Boone Caballero NP

## 2022-05-26 NOTE — PROGRESS NOTES
Patient takes medication in the morning. Charline Adjutant presents today for   Chief Complaint   Patient presents with    Follow-up     3 month        Is someone accompanying this pt? no    Is the patient using any DME equipment during OV? No      Depression Screening:  3 most recent PHQ Screens 5/26/2022   Little interest or pleasure in doing things Not at all   Feeling down, depressed, irritable, or hopeless Not at all   Total Score PHQ 2 0       Learning Assessment:  Learning Assessment 1/24/2022   PRIMARY LEARNER Patient   HIGHEST LEVEL OF EDUCATION - PRIMARY LEARNER  2 YEARS OF COLLEGE   BARRIERS PRIMARY LEARNER NONE   CO-LEARNER CAREGIVER No   PRIMARY LANGUAGE ENGLISH   LEARNER PREFERENCE PRIMARY DEMONSTRATION   ANSWERED BY Patient   RELATIONSHIP SELF       Health Maintenance reviewed and discussed and ordered per Provider. Health Maintenance Due   Topic Date Due    Pneumococcal 0-64 years (1 - PCV) Never done   . Coordination of Care:  1. Have you been to the ER, urgent care clinic since your last visit? [facility] since your last visit? [facility] 4/3/2022    2. Have you seen or consulted any other health care providers outside of the [address] [place] [address] since your last visit? Include any pap smears or colon screening. No      3. For patients aged 39-70: Has the patient had a colonoscopy / FIT/ Cologuard? Patient had this done in March 1, 2022. If the patient is female:    4. For patients aged 41-77: Has the patient had a mammogram within the past 2 years? NA - based on age or sex      11. For patients aged 21-65: Has the patient had a pap smear?  NA - based on age or sex

## 2022-06-07 ENCOUNTER — HOSPITAL ENCOUNTER (OUTPATIENT)
Dept: NUTRITION | Age: 47
Discharge: HOME | End: 2022-06-07
Payer: COMMERCIAL

## 2022-06-07 PROCEDURE — 97803 MED NUTRITION INDIV SUBSEQ: CPT

## 2022-06-07 NOTE — PROGRESS NOTES
NUTRITION - FOLLOW-UP TREATMENT NOTE  Patient Name: David Kinsey         Date: 2022  : 1975    YES/NO Patient  Verified  Diagnosis: weight gain   In time:   100             Out time:   130   Total Treatment Time (min):   30     SUBJECTIVE/ASSESSMENT    Current Wt: 313 Previous Wt: 316 Wt Change: -3     Initial Wt:  Total Wt change:        Changes in medication or medical history? Any new allergies, surgeries or procedures? NO    If yes, update Summary List                Nutrition Diagnosis        Diagnosis Status:   Nutrition Diagnosis: Obesity related to increased PO intake and lack of activity as evidenced by high BMI and unhealthy diet and exercise recall. [x]  Improved []  No Change    []  Declined        Nutrition Monitoring and Evaluation: Pt met with GI surgeon who recommends colon resection but pt will likely wait until the fall. Recovery is up to 8 weeks and pt will be less busy that time of the year. Pt's food journals indicate less compliance with meal plan over past 1-2 mos. Pt busy working on activities in his yard and doesn't take time to eat at recommended times, despite alarms set on phone. Pt more aware of macronutrient balance of foods and evaluating nutrition labels. Pt highly motivated to lose more weight before surgery this fall. Patient Education:  [x]  Review current plan with patient   []  Other:    Handouts/  Information Provided: []  Carbohydrates  []  Protein  []  Fiber  []  Serving Sizes  []  Fluids  []  General guidelines []  Diabetes  []  Cholesterol  []  Sodium  []  SBGM  []  Food Journals  []  Others:        Patient Goals 1.  Maintain carb budgets at each scheduled meal/snack       PLAN    [x]  Continue on current plan []  Follow-up PRN   []  Discharge due to :    [x]  Next appt: Aug 25     Dietitian: Cecy De La Torre MS, RD    Date: 2022 Time: 1:51 PM

## 2022-06-28 DIAGNOSIS — R00.2 FLUTTERING SENSATION OF HEART: ICD-10-CM

## 2022-07-05 DIAGNOSIS — R00.2 PALPITATIONS: ICD-10-CM

## 2022-07-12 ENCOUNTER — TELEPHONE (OUTPATIENT)
Dept: CARDIOLOGY CLINIC | Age: 47
End: 2022-07-12

## 2022-07-12 ENCOUNTER — OFFICE VISIT (OUTPATIENT)
Dept: CARDIOLOGY CLINIC | Age: 47
End: 2022-07-12
Payer: COMMERCIAL

## 2022-07-12 VITALS
DIASTOLIC BLOOD PRESSURE: 64 MMHG | WEIGHT: 314 LBS | HEART RATE: 83 BPM | BODY MASS INDEX: 46.37 KG/M2 | OXYGEN SATURATION: 95 % | TEMPERATURE: 97.3 F | SYSTOLIC BLOOD PRESSURE: 113 MMHG

## 2022-07-12 DIAGNOSIS — I10 ESSENTIAL HYPERTENSION WITH GOAL BLOOD PRESSURE LESS THAN 140/90: ICD-10-CM

## 2022-07-12 DIAGNOSIS — R00.2 PALPITATIONS: Primary | ICD-10-CM

## 2022-07-12 DIAGNOSIS — R94.39 ABNORMAL STRESS TEST: ICD-10-CM

## 2022-07-12 PROCEDURE — 99215 OFFICE O/P EST HI 40 MIN: CPT | Performed by: INTERNAL MEDICINE

## 2022-07-12 NOTE — PROGRESS NOTES
[facility]    42 y.o. male with history of hypertension, obesity    Patient is here today for follow-up appointment. Kb Figueroa He denies any prior history of MI or CHF. Over last 12 months patient has been having random episode of fluttering sensation in the chest.  This last usually less than 30 seconds. He is still very active and does not complain of any chest pain or chest tightness with exertion. He does feel some funny feeling in the chest during this fluttering sensation. Without any nausea or vomiting or diaphoresis. Patient is active and without any significant limitations  He has undergone echocardiogram and nuclear stress test and event monitor since last visit  Denies any nausea, vomiting, abdominal pain, fever, chills, sputum production. No hematuria or other bleeding complaints    Past Medical History:   Diagnosis Date    Diverticulitis     Hypertension     Kidney stone     Obesity        Review of Systems:  Cardiac symptoms as noted above in HPI. All others negative. Denies fatigue, malaise, skin rash, joint pain, blurring vision, photophobia, neck pain, hemoptysis, chronic cough, nausea, vomiting, hematuria, burning micturition, BRBPR, chronic headaches. Current Outpatient Medications   Medication Sig    amLODIPine-benazepril (LOTREL) 10-40 mg per capsule Take 1 Capsule by mouth daily.  OTHER daily. Joint supplement    cholecalciferol (Vitamin D3) (5000 Units/125 mcg) tab tablet Take 5,000 Units by mouth daily.  ascorbic acid, vitamin C, (Vitamin C) 1,000 mg tablet Take 1,000 mg by mouth daily.  MAGNESIUM GLYCINATE PO Take 400 mg by mouth two (2) times a day.  cyanocobalamin (Vitamin B-12) 1,000 mcg tablet Take 1,000 mcg by mouth daily.  KRILL OIL PO Take 1,000 mg by mouth daily. No current facility-administered medications for this visit.        Past Surgical History:   Procedure Laterality Date    HX ACL RECONSTRUCTION  1995    HX LITHOTRIPSY      HX SHOULDER ARTHROSCOPY  2007       Allergies and Sensitivities:  Allergies   Allergen Reactions    Egg Hives       Family History:  Family History   Problem Relation Age of Onset    Thyroid Disease Mother        Social History:  Social History     Tobacco Use    Smoking status: Never Smoker    Smokeless tobacco: Never Used   Vaping Use    Vaping Use: Never used   Substance Use Topics    Alcohol use: Yes    Drug use: Not Currently     He  reports that he has never smoked. He has never used smokeless tobacco.  He  reports current alcohol use. Physical Exam:  BP Readings from Last 3 Encounters:   07/12/22 113/64   05/26/22 122/74   04/20/22 (!) 145/91         Pulse Readings from Last 3 Encounters:   07/12/22 83   05/26/22 (!) 101   03/22/22 90          Wt Readings from Last 3 Encounters:   07/12/22 142.4 kg (314 lb)   05/26/22 144.8 kg (319 lb 3.2 oz)   04/20/22 151 kg (333 lb)       Constitutional: Oriented to person, place, and time. HENT: Head: Normocephalic and atraumatic. Neck: No JVD present. Carotid bruit is not appreciated. Cardiovascular: Regular rhythm. No murmur, gallop or rubs appreciated  Lung: Breath sounds normal. No respiratory distress. No ronchi or rales appreciated  Abdominal: No tenderness. No rebound and no guarding. Musculoskeletal: There is no lower extremity edema.  No cynosis    LABS:   @No results found for: WBC, WBCLT, HGBPOC, HGB, HGBP, HCTPOC, HCT, PHCT, RBCH, PLT, MCV, HGBEXT, HCTEXT, PLTEXT, HGBEXT, HCTEXT, PLTEXT  Lab Results   Component Value Date/Time    Sodium 137 01/24/2022 10:07 AM    Potassium 4.5 01/24/2022 10:07 AM    Chloride 104 01/24/2022 10:07 AM    CO2 27 01/24/2022 10:07 AM    Glucose 94 01/24/2022 10:07 AM    BUN 12 01/24/2022 10:07 AM    Creatinine 0.91 01/24/2022 10:07 AM     Lipids Latest Ref Rng & Units 1/24/2022   Chol, Total <200 MG/   HDL 40 - 60 MG/DL 39(L)   LDL 0 - 100 MG/DL 85.6 Trig <150 MG/DL 82   Chol/HDL Ratio 0 - 5.0   3.6   Some recent data might be hidden     Lab Results   Component Value Date/Time    ALT (SGPT) 37 2022 10:07 AM     Lab Results   Component Value Date/Time    Hemoglobin A1c 5.9 (H) 2022 10:07 AM     Lab Results   Component Value Date/Time    TSH 1.22 2022 03:20 PM       EK2022: Sinus rhythm at 83 bpm.  No ST changes of ischemia. Normal [place] and QRS interval    ECHO (2022)  Left Ventricle The EF by visual approximation is 55 - 60%. Left ventricle is moderately dilated. Normal wall thickness. Normal wall motion. Normal diastolic function. Left Atrium Left atrium size is normal. Left atrial volume index is normal (16-34 mL/m2). Right Ventricle Right ventricle size is normal. Normal systolic function. TAPSE is normal.   Right Atrium Right atrium size is normal.   Aortic Valve Tricuspid valve. No regurgitation. No stenosis. Mitral Valve Mildly thickened leaflet. Trace regurgitation. No stenosis noted. Tricuspid Valve Valve structure is normal. Trace regurgitation. No stenosis noted. Pulmonic Valve Valve structure is normal. Mild regurgitation. No stenosis noted. Pulmonary Artery Pulmonary hypertension not present. STRESS TEST (2022)  CONCLUSIONS     * This study is medium risk.     * Normal left ventricular function with calculated LVEF 66 %.     * small area of mild intensity anterior ischemia. IMPRESSION & PLAN:  42 y.o. male with hypertension obesity    Hypertension:  /64. Currently on amlodipine and benazepril. Continue same    Palpitation/chest discomfort:  Based on history, seems like patient is having fluttering and palpitation. Do not suspect angina. Event monitor in 2022, low risk. Sinus rhythm. 1 episode of heart rate as high as 150 bpm likely during elliptical exercise according to patient. PVC burden 1%. No arrhythmia  Echo, low risk in 2022.   Patient's palpitation has essentially resolved. No presyncope or syncope    Abnormal stress test:  For the evaluation of palpitations/chest discomfort, patient underwent nuclear stress test which was medium risk with evidence of mild intensity anterior ischemia  EF was reportedly normal.  Patient has occasional fluttering sensation in the chest and some funny feeling in the chest but does not complain of typical chest pain or chest tightness. Different management option discussed with the patient including but not limited to medical management, invasive versus noninvasive coronary evaluation. Cardiac coronary CT versus cardiac catheterization discussed. Discussed regarding management strategy which includes medical management vs. Ischemia evaluation ( non-invasive vs. Invasive). Risk, benefit and alternatives of each strategy discussed in detail. Risk, benefit, complication of LHC and possible PCI ( including but not limited to bleeding, vascular trauma requiring surgery,  infection, heart failure, stroke, MI, emergent bypass surgery, severe allergic reactions, kidney failure, dialysis and death ) were discussed with patient and willing to proceed with procedure. Will be using moderate sedation   By stating these are possible risks, this does not exclude the potential for additional risks not named here. She would like to think about the different option with the family member before proceeding. Have asked patient to start taking aspirin 81 mg daily    Obesity:  Max weight 345 lbs. Currently 315 lbs. This plan was discussed with patient who is in agreement. Thank you for allowing me to participate in patient care. Please feel free to call me if you have any question or concern. Carlyle Robles MD  Please note: This document has been produced using voice recognition software. Unrecognized errors in transcription may be present.

## 2022-07-12 NOTE — LETTER
7/12/2022    Patient: Flaquita Yates   YOB: 1975   Date of Visit: 7/12/2022     Dede Thomas, [address] [place] [address]  [address]  [facility] [phone]  [address] [facility] [address]    Dear Dede Thomas, NP,      Thank you for referring Mr. Flaquita Yates to [address] [place] [address] for evaluation. My notes for this consultation are attached. If you have questions, please do not hesitate to call me. I look forward to following your patient along with you.       Sincerely,    Clarisa Gómez MD

## 2022-07-12 NOTE — PROGRESS NOTES
Identified pt with two pt identifiers(name and ). Reviewed record in preparation for visit and have obtained necessary documentation. Leatha Rosado presents today for 3 mo fu  Chief Complaint   Patient presents with    Follow-up     3mo fu       Pt denies DIZZINESS, SOB, CHEST PAIN/ PRESSURE, FATIGUE/WEAKNESS, HEADACHES, SWELLING. Leatha Rosado preferred language for health care discussion is english/other. Personal Protective Equipment:   Personal Protective Equipment was used including: mask-surgical and hands-gloves. Patient was placed on no precaution(s). Patient was masked. Precautions:   Patient currently on None  Patient currently roomed with door closed. Is someone accompanying this pt? no    Is the patient using any DME equipment during [address]? no    Depression Screening:  3 most recent PHQ Screens 2022   Little interest or pleasure in doing things Not at all   Feeling down, depressed, irritable, or hopeless Not at all   Total Score PHQ 2 0       Learning Assessment:  Learning Assessment 2022   PRIMARY LEARNER Patient   HIGHEST LEVEL OF EDUCATION - PRIMARY LEARNER  2 YEARS [facility] PRIMARY LEARNER NONE   CO-LEARNER CAREGIVER No   PRIMARY LANGUAGE ENGLISH   LEARNER PREFERENCE PRIMARY DEMONSTRATION   ANSWERED BY Patient   RELATIONSHIP SELF       Abuse Screening:  Abuse Screening Questionnaire 2022   Do you ever feel afraid of your partner? N   Are you in a relationship with someone who physically or mentally threatens you? N   Is it safe for you to go home? Y       Fall Risk  No flowsheet data found. Coordination of Care:  1. Have you been to the ER, urgent care clinic since your last visit? Hospitalized since your last visit? no    2. Have you seen or consulted any other health care providers outside of the [address] [place] [address] since your last visit? Include any pap smears or colon screening.  no      Please see Red banners under Allergies and Med Rec to remove outside inquires. All correct information has been verified with patient and added to chart.      Medication's patient's would liked removed has been marked not taking to be removed per Verbal order and read back per Loki Lynn MD

## 2022-07-12 NOTE — PATIENT INSTRUCTIONS
New Medication/Medication Changes  Start aspirin 81 mg daily     **please allow 24-48 hrs for medication to be escribed to pharmacy** If you need any refills on medications please contact your pharmacy so that the request can be escribed to the provider for review. Left Heart Catheterization: About This Test  What is it? Left heart catheterization is a test to check the left side of your heart. Your doctor might check the structure of your heart, the motion of your heart, or the blood pressure inside the chambers. Why is this test done? This test gives information about how your heart is working. It can:  · Check blood flow and blood pressure in the chambers of the heart. · Check the pumping action of the heart. · Find out if a heart defect is present and how severe it is. · Find out how well the heart valves work. How is the test done? · You may get medicine to help you relax. · You will get a shot to numb the skin where the catheter goes in. · A thin tube called a catheter is put into a blood vessel in your groin or wrist. The doctor moves the catheter through the blood vessel into your heart. · Dye may be injected into your heart. Your doctor can watch on special monitors as the dye moves in your heart. The dye helps your doctor see blood flow in your heart. · If a heart defect is found, cardiac catheterization sometimes is used to repair it during the test.  · After the procedure, pressure may be applied for a short time to the area where the catheter was put into your blood vessel. This will help prevent bleeding. A small device may also be used to close the blood vessel. You may have a bandage or compression device on the catheter site. · You will stay in a room for at least a few hours to make sure the catheter site starts to heal.  · If the catheter was placed in your groin, you may lie in bed for up to a few hours.  If the catheter was put in your wrist, you will need to keep your arm still for at least 1 hour. How long does it take? The test may take about 1 hour. But you need time to get ready for the test and time to recover. This can take a few hours. What happens after the test?  · You may or may not need to stay in the hospital overnight. You will get more instructions for what to do at home. · Drink plenty of fluids for several hours after the test. If you have kidney, heart, or liver disease and have to limit fluids, talk with your doctor before you increase the amount of fluids you drink. Follow-up care is a key part of your treatment and safety. Be sure to make and go to all appointments, and call your doctor if you are having problems. It's also a good idea to know your test results and keep a list of the medicines you take. Where can you learn more? Go to http://www.gray.com/  Enter W306 in the search box to learn more about \"Left Heart Catheterization: About This Test.\"  Current as of: January 10, 2022               Content Version: 13.2  © 2006-2022 [employer]wise, [employer]. Care instructions adapted under license by [employer] (which disclaims liability or warranty for this information). If you have questions about a medical condition or this instruction, always ask your healthcare professional. [address] any warranty or liability for your use of this information.

## 2022-07-12 NOTE — TELEPHONE ENCOUNTER
Incoming from pt. Two patient Identifiers confirmed. Advised he would like [facility]. Advised I would forward message to . Pt verbalized understanding.

## 2022-07-13 NOTE — TELEPHONE ENCOUNTER
Called patient with to possible cath dates of 07/27 or 7/29 both at 9:15.  Patient states that he will call the office back once he has dicussed dates with his wife to see what works best for them

## 2022-07-14 RX ORDER — SODIUM CHLORIDE 0.9 % (FLUSH) 0.9 %
5-40 SYRINGE (ML) INJECTION AS NEEDED
Status: CANCELLED | OUTPATIENT
Start: 2022-07-14

## 2022-07-14 RX ORDER — SODIUM CHLORIDE 0.9 % (FLUSH) 0.9 %
5-40 SYRINGE (ML) INJECTION EVERY 8 HOURS
Status: CANCELLED | OUTPATIENT
Start: 2022-07-14

## 2022-07-21 DIAGNOSIS — K57.92 DIVERTICULITIS: Primary | ICD-10-CM

## 2022-07-21 RX ORDER — METRONIDAZOLE 500 MG/1
500 TABLET ORAL 3 TIMES DAILY
Qty: 30 TABLET | Refills: 0 | Status: SHIPPED | OUTPATIENT
Start: 2022-07-21 | End: 2022-07-31

## 2022-07-21 RX ORDER — CIPROFLOXACIN 500 MG/1
500 TABLET ORAL 2 TIMES DAILY
Qty: 20 TABLET | Refills: 0 | Status: SHIPPED | OUTPATIENT
Start: 2022-07-21 | End: 2022-07-31

## 2022-07-21 NOTE — PROGRESS NOTES
Patient came into office complaining of lower abdominal pain 7/10. Pt has history of diverticulitis. Sent Cipro and Flagyl antibiotics to pharmacy. Advised if any worsening of symptoms, to call 911 or go to ER.

## 2022-07-22 ENCOUNTER — HOSPITAL ENCOUNTER (OUTPATIENT)
Dept: LAB | Age: 47
Discharge: HOME | End: 2022-07-22
Payer: COMMERCIAL

## 2022-07-22 ENCOUNTER — TELEPHONE (OUTPATIENT)
Dept: CARDIOLOGY CLINIC | Age: 47
End: 2022-07-22

## 2022-07-22 ENCOUNTER — TELEPHONE (OUTPATIENT)
Dept: FAMILY MEDICINE CLINIC | Age: 47
End: 2022-07-22

## 2022-07-22 DIAGNOSIS — Z01.818 PREOP TESTING: Primary | ICD-10-CM

## 2022-07-22 DIAGNOSIS — Z01.818 PREOP TESTING: ICD-10-CM

## 2022-07-22 DIAGNOSIS — K57.92 DIVERTICULITIS: Primary | ICD-10-CM

## 2022-07-22 LAB
ALBUMIN SERPL-MCNC: 3.7 G/DL (ref 3.4–5)
ALBUMIN/GLOB SERPL: 1.1 {RATIO} (ref 0.8–1.7)
ALP SERPL-CCNC: 48 U/L (ref 45–117)
ALT SERPL-CCNC: 18 U/L (ref 16–61)
ANION GAP SERPL CALC-SCNC: 7 MMOL/L (ref 3–18)
AST SERPL-CCNC: 12 U/L (ref 10–38)
BASOPHILS # BLD: 0 K/UL (ref 0–0.1)
BASOPHILS NFR BLD: 0 % (ref 0–2)
BILIRUB SERPL-MCNC: 1.4 MG/DL (ref 0.2–1)
BUN SERPL-MCNC: 12 MG/DL (ref 7–18)
BUN/CREAT SERPL: 13 (ref 12–20)
CALCIUM SERPL-MCNC: 8.1 MG/DL (ref 8.5–10.1)
CHLORIDE SERPL-SCNC: 105 MMOL/L (ref 100–111)
CO2 SERPL-SCNC: 26 MMOL/L (ref 21–32)
CREAT SERPL-MCNC: 0.92 MG/DL (ref 0.6–1.3)
DIFFERENTIAL METHOD BLD: ABNORMAL
EOSINOPHIL # BLD: 0.1 K/UL (ref 0–0.4)
EOSINOPHIL NFR BLD: 1 % (ref 0–5)
ERYTHROCYTE [DISTWIDTH] IN BLOOD BY AUTOMATED COUNT: 14.5 % (ref 11.6–14.5)
GLOBULIN SER CALC-MCNC: 3.3 G/DL (ref 2–4)
GLUCOSE SERPL-MCNC: 111 MG/DL (ref 74–99)
HCT VFR BLD AUTO: 40.3 % (ref 36–48)
HGB BLD-MCNC: 13.4 G/DL (ref 13–16)
IMM GRANULOCYTES # BLD AUTO: 0.1 K/UL (ref 0–0.04)
IMM GRANULOCYTES NFR BLD AUTO: 0 % (ref 0–0.5)
INR PPP: 1.1 (ref 0.8–1.2)
LYMPHOCYTES # BLD: 2.3 K/UL (ref 0.9–3.6)
LYMPHOCYTES NFR BLD: 17 % (ref 21–52)
MCH RBC QN AUTO: 27.2 PG (ref 24–34)
MCHC RBC AUTO-ENTMCNC: 33.3 G/DL (ref 31–37)
MCV RBC AUTO: 81.9 FL (ref 78–100)
MONOCYTES # BLD: 1.2 K/UL (ref 0.05–1.2)
MONOCYTES NFR BLD: 9 % (ref 3–10)
NEUTS SEG # BLD: 9.7 K/UL (ref 1.8–8)
NEUTS SEG NFR BLD: 73 % (ref 40–73)
NRBC # BLD: 0 K/UL (ref 0–0.01)
NRBC BLD-RTO: 0 PER 100 WBC
PLATELET # BLD AUTO: 393 K/UL (ref 135–420)
PMV BLD AUTO: 9.4 FL (ref 9.2–11.8)
POTASSIUM SERPL-SCNC: 4.1 MMOL/L (ref 3.5–5.5)
PROT SERPL-MCNC: 7 G/DL (ref 6.4–8.2)
PROTHROMBIN TIME: 15.1 SEC (ref 11.5–15.2)
RBC # BLD AUTO: 4.92 M/UL (ref 4.35–5.65)
SODIUM SERPL-SCNC: 138 MMOL/L (ref 136–145)
WBC # BLD AUTO: 13.3 K/UL (ref 4.6–13.2)

## 2022-07-22 PROCEDURE — 80053 COMPREHEN METABOLIC PANEL: CPT

## 2022-07-22 PROCEDURE — 36415 COLL VENOUS BLD VENIPUNCTURE: CPT

## 2022-07-22 PROCEDURE — 85025 COMPLETE CBC W/AUTO DIFF WBC: CPT

## 2022-07-22 PROCEDURE — 85610 PROTHROMBIN TIME: CPT

## 2022-07-22 RX ORDER — OXYCODONE AND ACETAMINOPHEN 5; 325 MG/1; MG/1
1 TABLET ORAL
Qty: 21 TABLET | Refills: 0 | Status: SHIPPED | OUTPATIENT
Start: 2022-07-22 | End: 2022-07-29

## 2022-07-22 NOTE — PROGRESS NOTES
1 week of Percocet sent to pharmacy for diverticulitis flare; antibiotics sent yesterday to pharmacy.

## 2022-07-22 NOTE — LETTER
7/25/2022 2:14 PM    Mr. Autumn Velasco [address] [id number]    Instructions  Pre procedure labs(CBC, CMP, PT/INR) to be completed within 3-5 days prior to procedure. Labs drawn in [address] [place] [address]. Their hours of operation are Monday through Friday 7am-3:30 pm.  If you have and questions regarding directions please contact them at [phone]. Patients Name:  Autumn Almaraz    1. You are scheduled to have a left heart catherization  on July 29, 2022  at 9:15 am Please check in at 7: 45 am.   **YOU WILL NEED SOMEONE TO DRIVE YOU HOME AFTER PROCEDURE. 2. Please go to [facility] and park in the outpatient parking lot that is located around to the back of the hospital and enter through the [facility] building. Once you enter through the [facility] check in with the  there. The  will either give you directions or assist you in getting to the cath holding area. 3. You are not to eat or drink anything after midnight the night before your procedure. Small sips of water to take your medications is ok. 4. If you are diabetic, do not take your insulin/sugar pill the morning of the procedure. 5. MEDICATION INSTRUCTIONS:   Please take your morning medications with the following special instructions:     [x]          Please make sure to take your Blood pressure medication : amLODIPine-benazepril (LOTREL) 10-40 mg per capsule     6. We encourage families to wait in the waiting room on the first floor while the procedure is being done. The Doctor will come out and talk with you as soon as the procedure is over. 7. There is the possibility that you may spend the night in the hospital, depending on the results of the procedure. This will be determined after the procedure is done. If angioplasty or stent is planned, you will stay at least one day.      8. If you or your family have any questions, please call our office Monday -Friday, 9:00 a.m.-4:30 p.m.,  At [phone], and ask to speak to one of the nurses.           Sincerely,  Hola Lewis MD

## 2022-07-22 NOTE — TELEPHONE ENCOUNTER
Patient has been on antibiotics for the last 12 hours and wants to know if that would interfere with the blood work/ his procedure for next week

## 2022-07-22 NOTE — TELEPHONE ENCOUNTER
Contacted pt at. . Two patient Identifiers confirmed. Pt stated he is currently on Cipro and Flagyl for 10 days, pt is wondering if these will interfere with his ability to have his cath done. Pt informed that some antibiotics can change INR/PT levels temporarily. Message routed to provider for further review. Pt verbalized understanding.

## 2022-07-25 NOTE — TELEPHONE ENCOUNTER
Contacted pt at [facility] number. Two patient Identifiers confirmed. Advised pt instructions would be noted in letter for review via [employer]. Pt verbalized understanding.

## 2022-07-26 ENCOUNTER — TELEPHONE (OUTPATIENT)
Dept: CARDIOLOGY CLINIC | Age: 47
End: 2022-07-26

## 2022-07-26 DIAGNOSIS — Z01.818 PREOP TESTING: ICD-10-CM

## 2022-07-26 DIAGNOSIS — I10 ESSENTIAL (PRIMARY) HYPERTENSION: Primary | ICD-10-CM

## 2022-07-26 NOTE — TELEPHONE ENCOUNTER
Verbal order and read back per Trista Soria MD  Due to indication diverticulitis and antibiotics.  I would like to reschedule cath until wednesday next week

## 2022-07-26 NOTE — TELEPHONE ENCOUNTER
Contacted pt at [facility]. Two patient Identifiers confirmed. Advised pt per Dr Sylvia Ortiz reschedule. Pt stated \" I'm already back to work and have taken off. My wife has taken off for this thing too. I'm not rescheduling. I don't have any availability for next week so tell him I want it done Friday. \" Reiterated indication why provider would like to reschedule. Pt stated \" I don't care I want it this week. \" Advised pt I would review with provider next clinic and advise on his recommendations. Pt verbalized understanding. SSKI Counseling:  I discussed with the patient the risks of SSKI including but not limited to thyroid abnormalities, metallic taste, GI upset, fever, headache, acne, arthralgias, paraesthesias, lymphadenopathy, easy bleeding, arrhythmias, and allergic reaction.

## 2022-07-27 NOTE — TELEPHONE ENCOUNTER
Patient called very frustrated because he received a [employer] message stating that his procedure for this Friday was rescheduled to next week on 08/03. Patient states that \"this will not work\" due to him going out of town on the 5th. I explained to the patient that we are still awaiting an answer from Dayanara Hood to see if he would reconsider doing the procedure this Friday instead of moving it due to the patient being on antibiotics. The patient expressed frustration with the fact that he has to wait for an answer from the doctor. I explained to him that Dr. Dayanara Hood is only in the office on Tuesdays and Thursdays so we would have to wait until the next day he is in office to get an answer. Patient then threatened to come to the office so that he could speak Dr. Dayanara Hood himself and Jose J Clarke a conversation on professionalism because he believes that the doctor is not acting in a professional professional Ese Nicely" if he does not hear from us by end of day to day.

## 2022-07-28 ENCOUNTER — HOSPITAL ENCOUNTER (OUTPATIENT)
Dept: LAB | Age: 47
Discharge: HOME | End: 2022-07-28
Payer: COMMERCIAL

## 2022-07-28 DIAGNOSIS — Z01.818 PREOP TESTING: ICD-10-CM

## 2022-07-28 DIAGNOSIS — I10 ESSENTIAL (PRIMARY) HYPERTENSION: ICD-10-CM

## 2022-07-28 LAB
BASOPHILS # BLD: 0.1 K/UL (ref 0–0.1)
BASOPHILS NFR BLD: 1 % (ref 0–2)
DIFFERENTIAL METHOD BLD: ABNORMAL
EOSINOPHIL # BLD: 0.1 K/UL (ref 0–0.4)
EOSINOPHIL NFR BLD: 1 % (ref 0–5)
ERYTHROCYTE [DISTWIDTH] IN BLOOD BY AUTOMATED COUNT: 13.9 % (ref 11.6–14.5)
HCT VFR BLD AUTO: 46.7 % (ref 36–48)
HGB BLD-MCNC: 15.4 G/DL (ref 13–16)
IMM GRANULOCYTES # BLD AUTO: 0 K/UL (ref 0–0.04)
IMM GRANULOCYTES NFR BLD AUTO: 0 % (ref 0–0.5)
LYMPHOCYTES # BLD: 2.8 K/UL (ref 0.9–3.6)
LYMPHOCYTES NFR BLD: 28 % (ref 21–52)
MCH RBC QN AUTO: 26.7 PG (ref 24–34)
MCHC RBC AUTO-ENTMCNC: 33 G/DL (ref 31–37)
MCV RBC AUTO: 81.1 FL (ref 78–100)
MONOCYTES # BLD: 0.6 K/UL (ref 0.05–1.2)
MONOCYTES NFR BLD: 6 % (ref 3–10)
NEUTS SEG # BLD: 6.4 K/UL (ref 1.8–8)
NEUTS SEG NFR BLD: 64 % (ref 40–73)
NRBC # BLD: 0 K/UL (ref 0–0.01)
NRBC BLD-RTO: 0 PER 100 WBC
PLATELET # BLD AUTO: 531 K/UL (ref 135–420)
PMV BLD AUTO: 8.9 FL (ref 9.2–11.8)
RBC # BLD AUTO: 5.76 M/UL (ref 4.35–5.65)
WBC # BLD AUTO: 10 K/UL (ref 4.6–13.2)

## 2022-07-28 PROCEDURE — 36415 COLL VENOUS BLD VENIPUNCTURE: CPT

## 2022-07-28 PROCEDURE — 85025 COMPLETE CBC W/AUTO DIFF WBC: CPT

## 2022-07-28 NOTE — TELEPHONE ENCOUNTER
Contacted pt at home number. Two patient Identifiers confirmed. Advised pt per Dr. Sylvia Ortiz that his labs were back WNL and may proceed with heart cath tomorrow. Advised pt with new cath time and arrival time. Pt verbalized understanding.

## 2022-07-28 NOTE — TELEPHONE ENCOUNTER
Attempted to contact pt at  number, no answer. Lvm for pt to return call to office at [phone]. Will continue to try to contact pt. CBC order put in for PT to complete.

## 2022-07-28 NOTE — TELEPHONE ENCOUNTER
Incoming call from PT. Two patient Identifiers confirmed. Advised pt per  to get CBC done today to determine if he can proceed with his heart cath tomorrow. . Pt verbalized understanding.

## 2022-07-29 ENCOUNTER — HOSPITAL ENCOUNTER (OUTPATIENT)
Age: 47
Setting detail: OUTPATIENT SURGERY
Discharge: HOME | End: 2022-07-29
Attending: INTERNAL MEDICINE | Admitting: INTERNAL MEDICINE
Payer: COMMERCIAL

## 2022-07-29 VITALS
DIASTOLIC BLOOD PRESSURE: 54 MMHG | OXYGEN SATURATION: 98 % | RESPIRATION RATE: 16 BRPM | SYSTOLIC BLOOD PRESSURE: 103 MMHG | HEART RATE: 74 BPM

## 2022-07-29 DIAGNOSIS — R93.1 ABNORMAL NUCLEAR CARDIAC IMAGING TEST: ICD-10-CM

## 2022-07-29 PROCEDURE — 77030013797 HC KT TRNSDUC PRSSR EDWD -A: Performed by: INTERNAL MEDICINE

## 2022-07-29 PROCEDURE — 74011000636 HC RX REV CODE- 636: Performed by: INTERNAL MEDICINE

## 2022-07-29 PROCEDURE — C1894 INTRO/SHEATH, NON-LASER: HCPCS | Performed by: INTERNAL MEDICINE

## 2022-07-29 PROCEDURE — 77030012597: Performed by: INTERNAL MEDICINE

## 2022-07-29 PROCEDURE — 74011000258 HC RX REV CODE- 258: Performed by: INTERNAL MEDICINE

## 2022-07-29 PROCEDURE — 93458 L HRT ARTERY/VENTRICLE ANGIO: CPT | Performed by: INTERNAL MEDICINE

## 2022-07-29 PROCEDURE — 77030015766: Performed by: INTERNAL MEDICINE

## 2022-07-29 PROCEDURE — 99152 MOD SED SAME PHYS/QHP 5/>YRS: CPT | Performed by: INTERNAL MEDICINE

## 2022-07-29 PROCEDURE — 77030029997 HC DEV COM RDL R BND TELE -B: Performed by: INTERNAL MEDICINE

## 2022-07-29 PROCEDURE — 74011250636 HC RX REV CODE- 250/636: Performed by: INTERNAL MEDICINE

## 2022-07-29 PROCEDURE — 77030013761 HC KT HRT LFT ANGI -B: Performed by: INTERNAL MEDICINE

## 2022-07-29 PROCEDURE — 74011250637 HC RX REV CODE- 250/637: Performed by: INTERNAL MEDICINE

## 2022-07-29 PROCEDURE — 74011000250 HC RX REV CODE- 250: Performed by: INTERNAL MEDICINE

## 2022-07-29 RX ORDER — LIDOCAINE HYDROCHLORIDE 10 MG/ML
INJECTION, SOLUTION EPIDURAL; INFILTRATION; INTRACAUDAL; PERINEURAL
Status: DISCONTINUED
Start: 2022-07-29 | End: 2022-07-29 | Stop reason: HOSPADM

## 2022-07-29 RX ORDER — MIDAZOLAM HYDROCHLORIDE 1 MG/ML
INJECTION, SOLUTION INTRAMUSCULAR; INTRAVENOUS
Status: DISCONTINUED
Start: 2022-07-29 | End: 2022-07-29 | Stop reason: HOSPADM

## 2022-07-29 RX ORDER — HEPARIN SODIUM 1000 [USP'U]/ML
INJECTION, SOLUTION INTRAVENOUS; SUBCUTANEOUS AS NEEDED
Status: DISCONTINUED | OUTPATIENT
Start: 2022-07-29 | End: 2022-07-29 | Stop reason: HOSPADM

## 2022-07-29 RX ORDER — GUAIFENESIN 100 MG/5ML
81 LIQUID (ML) ORAL
Status: COMPLETED | OUTPATIENT
Start: 2022-07-29 | End: 2022-07-29

## 2022-07-29 RX ORDER — VERAPAMIL HYDROCHLORIDE 2.5 MG/ML
INJECTION, SOLUTION INTRAVENOUS AS NEEDED
Status: DISCONTINUED | OUTPATIENT
Start: 2022-07-29 | End: 2022-07-29 | Stop reason: HOSPADM

## 2022-07-29 RX ORDER — VERAPAMIL HYDROCHLORIDE 2.5 MG/ML
INJECTION, SOLUTION INTRAVENOUS
Status: DISCONTINUED
Start: 2022-07-29 | End: 2022-07-29 | Stop reason: HOSPADM

## 2022-07-29 RX ORDER — MIDAZOLAM HYDROCHLORIDE 1 MG/ML
INJECTION, SOLUTION INTRAMUSCULAR; INTRAVENOUS AS NEEDED
Status: DISCONTINUED | OUTPATIENT
Start: 2022-07-29 | End: 2022-07-29 | Stop reason: HOSPADM

## 2022-07-29 RX ORDER — LIDOCAINE HYDROCHLORIDE 10 MG/ML
INJECTION INFILTRATION; PERINEURAL AS NEEDED
Status: DISCONTINUED | OUTPATIENT
Start: 2022-07-29 | End: 2022-07-29 | Stop reason: HOSPADM

## 2022-07-29 RX ORDER — FENTANYL CITRATE 50 UG/ML
INJECTION, SOLUTION INTRAMUSCULAR; INTRAVENOUS AS NEEDED
Status: DISCONTINUED | OUTPATIENT
Start: 2022-07-29 | End: 2022-07-29 | Stop reason: HOSPADM

## 2022-07-29 RX ORDER — HEPARIN SODIUM 200 [USP'U]/100ML
INJECTION, SOLUTION INTRAVENOUS AS NEEDED
Status: DISCONTINUED | OUTPATIENT
Start: 2022-07-29 | End: 2022-07-29 | Stop reason: HOSPADM

## 2022-07-29 RX ORDER — FENTANYL CITRATE 50 UG/ML
INJECTION, SOLUTION INTRAMUSCULAR; INTRAVENOUS
Status: DISCONTINUED
Start: 2022-07-29 | End: 2022-07-29 | Stop reason: HOSPADM

## 2022-07-29 RX ORDER — SODIUM CHLORIDE 9 MG/ML
INJECTION, SOLUTION INTRAVENOUS
Status: COMPLETED | OUTPATIENT
Start: 2022-07-29 | End: 2022-07-29

## 2022-07-29 RX ORDER — HEPARIN SODIUM 200 [USP'U]/100ML
INJECTION, SOLUTION INTRAVENOUS
Status: DISCONTINUED
Start: 2022-07-29 | End: 2022-07-29 | Stop reason: HOSPADM

## 2022-07-29 RX ORDER — HEPARIN SODIUM 1000 [USP'U]/ML
INJECTION, SOLUTION INTRAVENOUS; SUBCUTANEOUS
Status: DISCONTINUED
Start: 2022-07-29 | End: 2022-07-29 | Stop reason: HOSPADM

## 2022-07-29 RX ORDER — IODIXANOL 320 MG/ML
INJECTION, SOLUTION INTRAVASCULAR AS NEEDED
Status: DISCONTINUED | OUTPATIENT
Start: 2022-07-29 | End: 2022-07-29 | Stop reason: HOSPADM

## 2022-07-29 RX ADMIN — ASPIRIN 81 MG CHEWABLE TABLET 81 MG: 81 TABLET CHEWABLE at 10:01

## 2022-07-29 NOTE — Clinical Note
TRANSFER - IN REPORT:     Verbal report received from: THUAN Dueñas. Report consisted of patient's Situation, Background, Assessment and   Recommendations(SBAR). Opportunity for questions and clarification was provided. Assessment completed upon patient's arrival to unit and care assumed. Patient transported with a Registered Nurse.

## 2022-07-29 NOTE — Clinical Note
Contrast Dose Calculator:   Patient's age: 42.   Patient's sex: Male. Patient weight (kg) = 142.2. Creatinine level (mg/dL) = 0.92. Creatinine clearance (mL/min): 206.09. Contrast concentration (mg/mL) = 300. MACD = 300 mL. Max Contrast dose per Creatinine Cl calculator = 463.7 mL.

## 2022-07-29 NOTE — DISCHARGE INSTRUCTIONS
DISCHARGE SUMMARY from Nurse    PATIENT INSTRUCTIONS:    After general anesthesia or intravenous sedation, for 24 hours or while taking prescription Narcotics:  Limit your activities  Do not drive and operate hazardous machinery  Do not make important personal or business decisions  Do  not drink alcoholic beverages  If you have not urinated within 8 hours after discharge, please contact your surgeon on call. Report the following to your surgeon:  Excessive pain, swelling, redness or odor of or around the surgical area  Temperature over 100.5  Nausea and vomiting lasting longer than 4 hours or if unable to take medications  Any signs of decreased circulation or nerve impairment to extremity: change in color, persistent  numbness, tingling, coldness or increase pain  Any questions    What to do at Home:  Recommended activity: Activity as tolerated and no driving for today,    These are general instructions for a healthy lifestyle:    No smoking/ No tobacco products/ Avoid exposure to second hand smoke  Surgeon General's Warning:  Quitting smoking now greatly reduces serious risk to your health. Obesity, smoking, and sedentary lifestyle greatly increases your risk for illness    A healthy diet, regular physical exercise & weight monitoring are important for maintaining a healthy lifestyle    You may be retaining fluid if you have a history of heart failure or if you experience any of the following symptoms:  Weight gain of 3 pounds or more overnight or 5 pounds in a week, increased swelling in our hands or feet or shortness of breath while lying flat in bed. Please call your doctor as soon as you notice any of these symptoms; do not wait until your next office visit. The discharge information has been reviewed with the patient. The patient verbalized understanding.   Discharge medications reviewed with the patient and appropriate educational materials and side effects teaching were provided. ___________________________________________________________________________________________________________________________________HEART CATHETERIZATION/ANGIOGRAPHY DISCHARGE INSTRUCTIONS    Check puncture site frequently for swelling or bleeding. If there is any bleeding, lie down and apply pressure over the area with a clean towel or washcloth. Notify your doctor for any redness, swelling, drainage, or oozing from the puncture site. Notify your doctor for any fever or chills. If the extremity becomes cold, numb, or painful call Dr. Lindsey Hancock  Activity should be limited for the next 48 hours. Climb stairs as little as possible and avoid any stooping, bending, or strenuous activity for 48 hours. No heavy lifting (anything over 10 pounds) for 3 days. You may resume your usual diet. Drink more fluids than usual.  Have a responsible person drive you home and stay with you for at least 24 hours after your heart catheterization/angiography. You may remove bandage from your Right and Arm in 24 hours. You may shower in 24 hours. No tub baths, hot tubs, or swimming for 1 week. Do not place any lotions, creams, powders, or ointments over puncture site for 1 week. You may place a clean band-aid over the puncture site each day for 5 days. Change daily. I have read the above instructions and have had the opportunity to ask questions.       Patient: ________________________   Date: 7/29/2022    Witness: _______________________   Date: 7/29/2022

## 2022-07-29 NOTE — H&P
Please see clinic note f for detail. NO CP since last clinic visit  On last day of antibiotic for possible diverticulitis which caused abdominela pain last week. Finishing up antibiotic tomorrow  No fever or chills or abdominal pain  WBC yesterday  normal.  I saw and examined patient and confirmed above. No interval change. Labs reviewed. Procedure explained to patient and all risk and benefit discussed with patient. Risk, benefit, complication of LHC and possible PCI ( including but not limited to bleeding, infection, heart failure, stroke, MI, emergent bypass surgery, kidney failure, dialysis and death ) were discussed with patient and willing to proceed with procedure. Proceed as planned. DW wife    History and physical has been reviewed.  There have been no significant clinical changes since the completion of the originally dated History and Physical.  Will be using moderate sedation.    ------------------------------------------------------------------------------------------------------------------

## 2022-07-29 NOTE — PROGRESS NOTES
TR band removed from right wrist, pt tolerated well, no bleeding or hematoma formation noted. Quick clot, Tegaderm, co band dressing applied.

## 2022-07-29 NOTE — Clinical Note
TRANSFER - OUT REPORT:     Verbal report given to: Kisha RN. Report consisted of patient's Situation, Background, Assessment and   Recommendations(SBAR). Opportunity for questions and clarification was provided. Patient transported with a Registered Nurse. Patient transported to: holding area.

## 2022-07-29 NOTE — PROGRESS NOTES
TRANSFER - IN REPORT:    Verbal report received from SHANEKA Akbar(name) on Thelbert Petties  being received from Cath Lab(unit) for routine post - op      Report consisted of patients Situation, Background, Assessment and   Recommendations(SBAR). Information from the following report(s) SBAR, Procedure Summary, Intake/Output, and MAR was reviewed with the receiving nurse. Opportunity for questions and clarification was provided. Assessment completed upon patients arrival to unit and care assumed.

## 2022-07-29 NOTE — PROGRESS NOTES
TRANSFER - OUT REPORT:    Verbal report given to Geeta Smith RN(name) on Evans Kellogg  being transferred to Cath Lab(unit) for ordered procedure       Report consisted of patients Situation, Background, Assessment and   Recommendations(SBAR). Information from the following report(s) SBAR, Intake/Output, MAR, and Pre Procedure Checklist was reviewed with the receiving nurse. Lines:   Peripheral IV 07/29/22 Right Antecubital (Active)       Peripheral IV 07/29/22 Anterior; Left Forearm (Active)        Opportunity for questions and clarification was provided.       Patient transported with:   Registered Nurse

## 2022-08-12 ENCOUNTER — TELEPHONE (OUTPATIENT)
Dept: FAMILY MEDICINE CLINIC | Age: 47
End: 2022-08-12

## 2022-08-12 DIAGNOSIS — N48.89 PENILE PAIN: Primary | ICD-10-CM

## 2022-08-12 NOTE — TELEPHONE ENCOUNTER
----- Message from Rylee Leahy sent at 8/12/2022 10:31 AM EDT -----  Subject: Referral Request    Reason for referral request? Urology  Provider patient wants to be referred to(if known): [address]    Provider Phone Number(if known): Additional Information for Provider?  [facility] on [address]  ---------------------------------------------------------------------------  --------------  [address] [employer]    [phone]; OK to leave message on voicemail  ---------------------------------------------------------------------------  --------------

## 2022-08-22 ENCOUNTER — HOSPITAL ENCOUNTER (OUTPATIENT)
Dept: LAB | Age: 47
Discharge: HOME | End: 2022-08-22
Payer: COMMERCIAL

## 2022-08-22 DIAGNOSIS — E78.1 HYPERTRIGLYCERIDEMIA: ICD-10-CM

## 2022-08-22 DIAGNOSIS — R73.03 PREDIABETES: ICD-10-CM

## 2022-08-22 DIAGNOSIS — Z13.21 ENCOUNTER FOR VITAMIN DEFICIENCY SCREENING: ICD-10-CM

## 2022-08-22 DIAGNOSIS — K76.0 FATTY LIVER: ICD-10-CM

## 2022-08-22 LAB
25(OH)D3 SERPL-MCNC: 66.7 NG/ML (ref 30–100)
ALBUMIN SERPL-MCNC: 4.2 G/DL (ref 3.4–5)
ALBUMIN/GLOB SERPL: 1.1 {RATIO} (ref 0.8–1.7)
ALP SERPL-CCNC: 55 U/L (ref 45–117)
ALT SERPL-CCNC: 20 U/L (ref 16–61)
ANION GAP SERPL CALC-SCNC: 7 MMOL/L (ref 3–18)
AST SERPL-CCNC: 11 U/L (ref 10–38)
BILIRUB SERPL-MCNC: 0.4 MG/DL (ref 0.2–1)
BUN SERPL-MCNC: 14 MG/DL (ref 7–18)
BUN/CREAT SERPL: 14 (ref 12–20)
CALCIUM SERPL-MCNC: 9.2 MG/DL (ref 8.5–10.1)
CHLORIDE SERPL-SCNC: 103 MMOL/L (ref 100–111)
CHOLEST SERPL-MCNC: 136 MG/DL
CO2 SERPL-SCNC: 28 MMOL/L (ref 21–32)
CREAT SERPL-MCNC: 0.99 MG/DL (ref 0.6–1.3)
EST. AVERAGE GLUCOSE BLD GHB EST-MCNC: 117 MG/DL
GLOBULIN SER CALC-MCNC: 3.8 G/DL (ref 2–4)
GLUCOSE SERPL-MCNC: 97 MG/DL (ref 74–99)
HBA1C MFR BLD: 5.7 % (ref 4.2–5.6)
HDLC SERPL-MCNC: 38 MG/DL (ref 40–60)
HDLC SERPL: 3.6 {RATIO} (ref 0–5)
LDLC SERPL CALC-MCNC: 82.8 MG/DL (ref 0–100)
LIPID PROFILE,FLP: ABNORMAL
POTASSIUM SERPL-SCNC: 4.7 MMOL/L (ref 3.5–5.5)
PROT SERPL-MCNC: 8 G/DL (ref 6.4–8.2)
SODIUM SERPL-SCNC: 138 MMOL/L (ref 136–145)
TRIGL SERPL-MCNC: 76 MG/DL (ref ?–150)
VLDLC SERPL CALC-MCNC: 15.2 MG/DL

## 2022-08-22 PROCEDURE — 36415 COLL VENOUS BLD VENIPUNCTURE: CPT

## 2022-08-22 PROCEDURE — 83036 HEMOGLOBIN GLYCOSYLATED A1C: CPT

## 2022-08-22 PROCEDURE — 82306 VITAMIN D 25 HYDROXY: CPT

## 2022-08-22 PROCEDURE — 80061 LIPID PANEL: CPT

## 2022-08-22 PROCEDURE — 80053 COMPREHEN METABOLIC PANEL: CPT

## 2022-08-22 RX ORDER — AMLODIPINE BESYLATE AND BENAZEPRIL HYDROCHLORIDE 10; 40 MG/1; MG/1
1 CAPSULE ORAL DAILY
Qty: 90 CAPSULE | Refills: 1 | Status: SHIPPED | OUTPATIENT
Start: 2022-08-22

## 2022-08-26 ENCOUNTER — OFFICE VISIT (OUTPATIENT)
Dept: FAMILY MEDICINE CLINIC | Age: 47
End: 2022-08-26
Payer: COMMERCIAL

## 2022-08-26 VITALS
TEMPERATURE: 98.2 F | HEART RATE: 85 BPM | SYSTOLIC BLOOD PRESSURE: 119 MMHG | DIASTOLIC BLOOD PRESSURE: 76 MMHG | HEIGHT: 69 IN | RESPIRATION RATE: 16 BRPM | WEIGHT: 307.2 LBS | OXYGEN SATURATION: 96 % | BODY MASS INDEX: 45.5 KG/M2

## 2022-08-26 DIAGNOSIS — N50.811 PAIN IN BOTH TESTICLES: ICD-10-CM

## 2022-08-26 DIAGNOSIS — K57.92 DIVERTICULITIS: Primary | ICD-10-CM

## 2022-08-26 DIAGNOSIS — E66.01 CLASS 3 SEVERE OBESITY DUE TO EXCESS CALORIES WITH SERIOUS COMORBIDITY AND BODY MASS INDEX (BMI) OF 45.0 TO 49.9 IN ADULT (HCC): ICD-10-CM

## 2022-08-26 DIAGNOSIS — N50.812 PAIN IN BOTH TESTICLES: ICD-10-CM

## 2022-08-26 DIAGNOSIS — Z98.890 HISTORY OF CARDIAC CATH: ICD-10-CM

## 2022-08-26 DIAGNOSIS — K76.0 FATTY LIVER: ICD-10-CM

## 2022-08-26 DIAGNOSIS — I10 HYPERTENSION, UNSPECIFIED TYPE: ICD-10-CM

## 2022-08-26 PROCEDURE — 99213 OFFICE O/P EST LOW 20 MIN: CPT | Performed by: NURSE PRACTITIONER

## 2022-08-26 NOTE — PROGRESS NOTES
The Toni Constable 42 y.o. male presents today for:    Chief Complaint   Patient presents with    Follow-up     3 month     Labs   Reviewed labs in detail     Cardiac catheter 7/29 showed no blockages     Diverticulitis   --surgery October   --recent flare-placed on antibiotics     Testicular pain and bladder spasms (partial colectomy)     Lost 10 pounds     Decrease beer consumption    Review of Systems   Constitutional: Negative. Negative for chills, fever and weight loss. HENT: Negative. Eyes: Negative. Negative for blurred vision and double vision. Respiratory:  Negative for cough, shortness of breath and wheezing. Cardiovascular:  Negative for chest pain, palpitations and leg swelling. Gastrointestinal:  Positive for abdominal pain. Negative for blood in stool, constipation and diarrhea. Intermittent; recent diverticulitis flare   Genitourinary: Negative. Negative for frequency and urgency. Intermittent testicular pain   Musculoskeletal:  Positive for back pain and joint pain. Negative for falls. Right knee pain    Left plantar fascitis    Skin: Negative. Neurological: Negative. Negative for dizziness and headaches. Endo/Heme/Allergies: Negative. Psychiatric/Behavioral:  Negative for depression, substance abuse and suicidal ideas. 12/9-stopped alcohol consumption     There are no preventive care reminders to display for this patient. Past Medical History:   Diagnosis Date    Diverticulitis     Hypertension     Kidney stone     Obesity        Physical Exam  Constitutional:       General: He is not in acute distress. Appearance: He is obese. He is not toxic-appearing. Cardiovascular:      Rate and Rhythm: Normal rate and regular rhythm. Pulses: Normal pulses. Heart sounds: Normal heart sounds. No murmur heard. Pulmonary:      Effort: Pulmonary effort is normal.      Breath sounds: Normal breath sounds.    Abdominal:      General: There is distension. Palpations: Abdomen is soft. There is no mass. Tenderness: There is no abdominal tenderness. Hernia: No hernia is present. Musculoskeletal:         General: Normal range of motion. Skin:     General: Skin is warm. Capillary Refill: Capillary refill takes less than 2 seconds. Neurological:      General: No focal deficit present. Mental Status: He is alert and oriented to person, place, and time. Psychiatric:         Mood and Affect: Mood normal.    Visit Vitals  /76 (BP 1 Location: Left upper arm, BP Patient Position: Sitting)   Pulse 85   Temp 98.2 °F (36.8 °C) (Temporal)   Resp 16   Ht 5' 9\" (1.753 m)   Wt 307 lb 3.2 oz (139.3 kg)   SpO2 96%   BMI 45.37 kg/m²     Current Outpatient Medications:     amLODIPine-benazepril (LOTREL) 10-40 mg per capsule, Take 1 Capsule by mouth daily. , Disp: 90 Capsule, Rfl: 1    OTHER, daily. Joint supplement, Disp: , Rfl:     cholecalciferol (VITAMIN D3) (5000 Units/125 mcg) tab tablet, Take 5,000 Units by mouth daily. , Disp: , Rfl:     ascorbic acid, vitamin C, (VITAMIN C) 1,000 mg tablet, Take 1,000 mg by mouth daily. , Disp: , Rfl:     cyanocobalamin 1,000 mcg tablet, Take 1,000 mcg by mouth daily. , Disp: , Rfl:     KRILL OIL PO, Take 1,000 mg by mouth daily. , Disp: , Rfl:    There are no diagnoses linked to this encounter. ASSESSMENT and PLAN    ICD-10-CM ICD-9-CM    1. Diverticulitis  K57.92 562.11       2. Hypertension, unspecified type  I10 401.9       3. Fatty liver  K76.0 571.8       4. Pain in both testicles  N50.811 608.9 US SCROTUM/TESTICLES    N50.812        5. Class 3 severe obesity due to excess calories with serious comorbidity and body mass index (BMI) of 45.0 to 49.9 in adult ([facility])  E66.01 278.01     Z68.42 V85.42       6. History of cardiac cath  Z98.890 V45.89         Follow-up and Dispositions    Return in about 6 months (around 2/26/2023) for with fasting labs 1 week prior .        Referral placed already for urology  --testicular ultrasound ordered    lab results and schedule of future lab studies reviewed with patient  reviewed diet, exercise and weight control  cardiovascular risk and specific lipid/LDL goals reviewed  reviewed medications and side effects in detail    Brynn Mcgraw NP

## 2022-08-26 NOTE — PROGRESS NOTES
Patient states he has taken medication today. Patient states he had a cathter placed on July 29, 2022. [place] [facility] presents today for   Chief Complaint   Patient presents with    Follow-up     3 month     Labs       Is someone accompanying this pt? Yes wife     Is the patient using any DME equipment during [address]? no    Depression Screening:  3 most recent PHQ Screens 8/26/2022   Little interest or pleasure in doing things Not at all   Feeling down, depressed, irritable, or hopeless Not at all   Total Score PHQ 2 0       Learning Assessment:  Learning Assessment 1/24/2022   PRIMARY LEARNER Patient   HIGHEST LEVEL OF EDUCATION - PRIMARY LEARNER  2 YEARS OF COLLEGE   BARRIERS PRIMARY LEARNER NONE   CO-LEARNER CAREGIVER No   PRIMARY LANGUAGE ENGLISH   LEARNER PREFERENCE PRIMARY DEMONSTRATION   ANSWERED BY Patient   RELATIONSHIP SELF       Health Maintenance reviewed and discussed and ordered per Provider. There are no preventive care reminders to display for this patient. .      Coordination of Care:  1. Have you been to the ER, urgent care clinic since your last visit? Hospitalized since your last visit? no    2. Have you seen or consulted any other health care providers outside of the [address] [place] [address] since your last visit? Include any pap smears or colon screening. No          3. For patients aged 39-70: Has the patient had a colonoscopy / FIT/ Cologuard? Yes - no Care Gap present      If the patient is female:    4. For patients aged 41-77: Has the patient had a mammogram within the past 2 years? NA - based on age or sex      11. For patients aged 21-65: Has the patient had a pap smear?  NA - based on age or sex

## 2022-09-09 ENCOUNTER — TELEPHONE (OUTPATIENT)
Dept: FAMILY MEDICINE CLINIC | Age: 47
End: 2022-09-09

## 2022-10-13 ENCOUNTER — OFFICE VISIT (OUTPATIENT)
Dept: CARDIOLOGY CLINIC | Age: 47
End: 2022-10-13
Payer: COMMERCIAL

## 2022-10-13 VITALS
HEIGHT: 69 IN | WEIGHT: 311 LBS | BODY MASS INDEX: 46.06 KG/M2 | SYSTOLIC BLOOD PRESSURE: 130 MMHG | HEART RATE: 93 BPM | OXYGEN SATURATION: 97 % | DIASTOLIC BLOOD PRESSURE: 72 MMHG

## 2022-10-13 DIAGNOSIS — R93.1 ABNORMAL NUCLEAR CARDIAC IMAGING TEST: ICD-10-CM

## 2022-10-13 DIAGNOSIS — I10 ESSENTIAL HYPERTENSION WITH GOAL BLOOD PRESSURE LESS THAN 140/90: Primary | ICD-10-CM

## 2022-10-13 DIAGNOSIS — E78.00 PURE HYPERCHOLESTEROLEMIA: ICD-10-CM

## 2022-10-13 DIAGNOSIS — I10 ESSENTIAL (PRIMARY) HYPERTENSION: ICD-10-CM

## 2022-10-13 PROCEDURE — 99214 OFFICE O/P EST MOD 30 MIN: CPT | Performed by: INTERNAL MEDICINE

## 2022-10-13 NOTE — LETTER
10/13/2022    Patient: Judson Lindsay   YOB: 1975   Date of Visit: 10/13/2022     Annmarie Draper, [address] [place] [address]  [place] [address] [place] [address] [phone]  V[address] In [facility] [address]    Dear Annmarie Draper NP,      Thank you for referring Mr. Judson Lindsay to [address] [place] [address] for evaluation. My notes for this consultation are attached. If you have questions, please do not hesitate to call me. I look forward to following your patient along with you.       Sincerely,    Lisa Sanches MD

## 2022-10-13 NOTE — PROGRESS NOTES
Mariaa Amor presents today for   Chief Complaint   Patient presents with    Follow-up     3 month follow up - Left Heart Cath : 07-       Mariaa Amor preferred language for health care discussion is english/other. Is someone accompanying this pt? no    Is the patient using any DME equipment during [address]? no    Depression Screening:  3 most recent PHQ Screens 10/13/2022   Little interest or pleasure in doing things Not at all   Feeling down, depressed, irritable, or hopeless Not at all   Total Score PHQ 2 0       Learning Assessment:  Learning Assessment 10/13/2022   PRIMARY LEARNER Patient   HIGHEST LEVEL OF EDUCATION - PRIMARY LEARNER  -   BARRIERS PRIMARY LEARNER -   CO-LEARNER CAREGIVER -   PRIMARY LANGUAGE ENGLISH   LEARNER PREFERENCE PRIMARY DEMONSTRATION   ANSWERED BY patient   RELATIONSHIP SELF       Abuse Screening:  Abuse Screening Questionnaire 10/13/2022   Do you ever feel afraid of your partner? N   Are you in a relationship with someone who physically or mentally threatens you? N   Is it safe for you to go home? Y             Pt currently taking Anticoagulant therapy? no    Pt currently taking Antiplatelet therapy ? no      Coordination of Care:  1. Have you been to the ER, urgent care clinic since your last visit? Hospitalized since your last visit? no    2. Have you seen or consulted any other health care providers outside of the [address] [place] [address] since your last visit? Include any pap smears or colon screening.  no

## 2022-10-13 NOTE — PROGRESS NOTES
[facility]    41 y.o. male with history of hypertension, obesity    Patient is here today for follow-up appointment. Patient underwent cardiac catheterization in 07/2022 after having a abnormal nuclear stress test.  Cardiac catheterization did not show any evidence of obstructive coronary disease  Since then patient is denying any symptoms concerning for angina or heart failure. Denies any presyncope or syncope. No cardiac symptoms at report at this time. Continues to work at fire department and able to perform activity and exercise without any limitation. Denies any nausea, vomiting, abdominal pain, fever, chills, sputum production. No hematuria or other bleeding complaints    Past Medical History:   Diagnosis Date    Diverticulitis     Hypertension     Kidney stone     Obesity        Review of Systems:  Cardiac symptoms as noted above in HPI. All others negative. Denies fatigue, malaise, skin rash, joint pain, blurring vision, photophobia, neck pain, hemoptysis, chronic cough, nausea, vomiting, hematuria, burning micturition, BRBPR, chronic headaches. Current Outpatient Medications   Medication Sig    MAGNESIUM PO Take 1 Tablet by mouth daily. amLODIPine-benazepril (LOTREL) 10-40 mg per capsule Take 1 Capsule by mouth daily. OTHER daily. Joint supplement    cholecalciferol (VITAMIN D3) (5000 Units/125 mcg) tab tablet Take 5,000 Units by mouth daily. ascorbic acid, vitamin C, (VITAMIN C) 1,000 mg tablet Take 1,000 mg by mouth daily. cyanocobalamin 1,000 mcg tablet Take 1,000 mcg by mouth daily. KRILL OIL PO Take 1,000 mg by mouth daily. No current facility-administered medications for this visit.        Past Surgical History:   Procedure Laterality Date    HX ACL RECONSTRUCTION  1995    HX LITHOTRIPSY      HX SHOULDER ARTHROSCOPY  2007       Allergies and Sensitivities:  Allergies   Allergen Reactions    Egg Hives Family History:  Family History   Problem Relation Age of Onset    Thyroid Disease Mother        Social History:  Social History     Tobacco Use    Smoking status: Never    Smokeless tobacco: Never   Vaping Use    Vaping Use: Never used   Substance Use Topics    Alcohol use: Yes    Drug use: Not Currently     He  reports that he has never smoked. He has never used smokeless tobacco.  He  reports current alcohol use. Physical Exam:  BP Readings from Last 3 Encounters:   10/13/22 130/72   08/26/22 119/76   07/29/22 (!) 103/54         Pulse Readings from Last 3 Encounters:   10/13/22 93   08/26/22 85   07/29/22 74          Wt Readings from Last 3 Encounters:   10/13/22 141.1 kg (311 lb)   10/04/22 136.1 kg (300 lb)   08/26/22 139.3 kg (307 lb 3.2 oz)       Constitutional: Oriented to person, place, and time. HENT: Head: Normocephalic and atraumatic. Neck: No JVD present. Carotid bruit is not appreciated. Cardiovascular: Regular rhythm. No murmur, gallop or rubs appreciated  Lung: Breath sounds normal. No respiratory distress. No ronchi or rales appreciated  Abdominal: No tenderness. No rebound and no guarding. Musculoskeletal: There is no lower extremity edema.  No cynosis    LABS:   @  Lab Results   Component Value Date/Time    WBC 10.0 07/28/2022 09:48 AM    HGB 15.4 07/28/2022 09:48 AM    HCT 46.7 07/28/2022 09:48 AM    PLATELET 030 (H) 99/79/7738 09:48 AM    MCV 81.1 07/28/2022 09:48 AM     Lab Results   Component Value Date/Time    Sodium 138 08/22/2022 08:53 AM    Potassium 4.7 08/22/2022 08:53 AM    Chloride 103 08/22/2022 08:53 AM    CO2 28 08/22/2022 08:53 AM    Glucose 97 08/22/2022 08:53 AM    BUN 14 08/22/2022 08:53 AM    Creatinine 0.99 08/22/2022 08:53 AM     Lipids Latest Ref Rng & Units 8/22/2022 1/24/2022   Chol, Total <200 MG/ 141   HDL 40 - 60 MG/DL 38(L) 39(L)   LDL 0 - 100 MG/DL 82.8 85.6   Trig <150 MG/DL 76 82   Chol/HDL Ratio 0 - 5.0   3.6 3.6   Some recent data might be hidden     Lab Results   Component Value Date/Time    ALT (SGPT) 20 2022 08:53 AM     Lab Results   Component Value Date/Time    Hemoglobin A1c 5.7 (H) 2022 08:53 AM     Lab Results   Component Value Date/Time    TSH 1.22 2022 03:20 PM       EK2022: Sinus rhythm at 83 bpm.  No ST changes of ischemia. Normal NC and QRS interval    ECHO (2022)  Left Ventricle The EF by visual approximation is 55 - 60%. Left ventricle is moderately dilated. Normal wall thickness. Normal wall motion. Normal diastolic function. Left Atrium Left atrium size is normal. Left atrial volume index is normal (16-34 mL/m2). Right Ventricle Right ventricle size is normal. Normal systolic function. TAPSE is normal.   Right Atrium Right atrium size is normal.   Aortic Valve Tricuspid valve. No regurgitation. No stenosis. Mitral Valve Mildly thickened leaflet. Trace regurgitation. No stenosis noted. Tricuspid Valve Valve structure is normal. Trace regurgitation. No stenosis noted. Pulmonic Valve Valve structure is normal. Mild regurgitation. No stenosis noted. Pulmonary Artery Pulmonary hypertension not present. STRESS TEST (2022)  CONCLUSIONS     * This study is medium risk. * Normal left ventricular function with calculated LVEF 66 %. * small area of mild intensity anterior ischemia. CARDIAC PROCEDURE 2022  Conclusion  No angiographic evidence of obstructive CAD  Normal LVEF  LVEDP 10 mm Hg  False abnormal stress test  Continue risk factor modification    IMPRESSION & PLAN:  41 y.o. male with hypertension obesity    Hypertension: BP 80/72. Currently on amlodipine and benazepril. Continue same    Palpitation/chest discomfort:  No significant symptoms since last visit. Cardiac catheterization in 2022, angiographically normal coronary arteries  Event monitor in 2022, low risk. Sinus rhythm.   1 episode of heart rate as high as 150 bpm likely during elliptical exercise according to patient. PVC burden 1%. No arrhythmia  Echo, low risk in 03/2022. No presyncope or syncope    Obesity: Max weight 345 lbs. Currently 311 lbs. This plan was discussed with patient who is in agreement. Thank you for allowing me to participate in patient care. Please feel free to call me if you have any question or concern. Charmayne Hai, MD  Please note: This document has been produced using voice recognition software. Unrecognized errors in transcription may be present.

## 2022-10-14 DIAGNOSIS — N50.811 PAIN IN BOTH TESTICLES: ICD-10-CM

## 2022-10-14 DIAGNOSIS — N50.812 PAIN IN BOTH TESTICLES: ICD-10-CM

## 2022-12-01 ENCOUNTER — OFFICE VISIT (OUTPATIENT)
Dept: FAMILY MEDICINE CLINIC | Age: 47
End: 2022-12-01
Payer: COMMERCIAL

## 2022-12-01 VITALS
RESPIRATION RATE: 16 BRPM | SYSTOLIC BLOOD PRESSURE: 124 MMHG | WEIGHT: 300.4 LBS | HEART RATE: 84 BPM | HEIGHT: 69 IN | BODY MASS INDEX: 44.49 KG/M2 | OXYGEN SATURATION: 95 % | TEMPERATURE: 97.3 F | DIASTOLIC BLOOD PRESSURE: 76 MMHG

## 2022-12-01 DIAGNOSIS — K57.32 DIVERTICULITIS OF COLON: ICD-10-CM

## 2022-12-01 DIAGNOSIS — K57.92 DIVERTICULITIS: ICD-10-CM

## 2022-12-01 DIAGNOSIS — R73.03 PREDIABETES: ICD-10-CM

## 2022-12-01 DIAGNOSIS — E04.2 MULTIPLE THYROID NODULES: ICD-10-CM

## 2022-12-01 DIAGNOSIS — M25.561 CHRONIC PAIN OF RIGHT KNEE: ICD-10-CM

## 2022-12-01 DIAGNOSIS — I10 HYPERTENSION, UNSPECIFIED TYPE: ICD-10-CM

## 2022-12-01 DIAGNOSIS — G89.29 CHRONIC PAIN OF RIGHT KNEE: ICD-10-CM

## 2022-12-01 DIAGNOSIS — Z09 HOSPITAL DISCHARGE FOLLOW-UP: Primary | ICD-10-CM

## 2022-12-01 DIAGNOSIS — Z90.49 S/P PARTIAL COLECTOMY: ICD-10-CM

## 2022-12-01 DIAGNOSIS — I10 ESSENTIAL (PRIMARY) HYPERTENSION: ICD-10-CM

## 2022-12-01 PROBLEM — N50.811 PAIN IN BOTH TESTICLES: Status: RESOLVED | Noted: 2022-08-26 | Resolved: 2022-12-01

## 2022-12-01 PROBLEM — N50.812 PAIN IN BOTH TESTICLES: Status: RESOLVED | Noted: 2022-08-26 | Resolved: 2022-12-01

## 2022-12-01 NOTE — PROGRESS NOTES
Abhinav Pearce is a 41 y.o. male and presents with Hospital Follow Up, Thyroid Problem (Nodules), and Knee Pain       Assessment/Plan:    Diagnoses and all orders for this visit:    1. Hospital discharge follow-up  -     [place] DISCHARGE MEDS RECONCILED W/ CURRENT OUTPATIENT MED LIST    2. Chronic pain of right knee  -     REFERRAL TO ORTHOPEDICS    3. Hypertension, unspecified type    4. Diverticulitis    5. Prediabetes    6. Diverticulitis of colon    7. Essential (primary) hypertension    8. Multiple thyroid nodules  -     US THYROID/PARATHYROID/SOFT TISS; Future    9. S/P partial colectomy    Follow-up and Dispositions    Return in about 4 months (around 4/1/2023). Health Maintenance:   Health Maintenance   Topic Date Due    COVID-19 Vaccine (3 - Booster for Anival series) 03/22/2022    Flu Vaccine (1) 08/01/2022    A1C test (Diabetic or Prediabetic)  08/22/2023    Depression Screen  12/01/2023    Colorectal Cancer Screening Combo  03/01/2027    Lipid Screen  08/22/2027    DTaP/Tdap/Td series (4 - Td or Tdap) 05/16/2032    Hepatitis C Screening  Completed    Pneumococcal 0-64 years  Aged Out        Subjective:    Labs obtained prior to visit? YES  Reviewed with patient? 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 Negative for chills, fever and weight loss. HENT: Negative. Eyes: Negative. Negative for blurred vision and double vision. Respiratory:  Negative for cough, shortness of breath and wheezing. Cardiovascular:  Negative for chest pain, palpitations and leg swelling. Gastrointestinal:  Negative for abdominal pain, blood in stool, constipation and diarrhea.    Genitourinary: Negative. Negative for frequency and urgency. Musculoskeletal:  Positive for back pain and joint pain. Negative for falls. Right knee pain       Skin: Negative. Neurological: Negative. Negative for dizziness and headaches. Endo/Heme/Allergies: Negative. Psychiatric/Behavioral:  Negative for depression, substance abuse and suicidal ideas. Decreased alcohol consumption        The problem list was updated as a part of today's visit. Patient Active Problem List   Diagnosis Code    Essential (primary) hypertension I10    Obesity, unspecified E66.9    Pain in left knee M25.562    Spontaneous rupture of other tendons, right upper arm M66.821    Diverticulitis K57.92    Perforated diverticulum K57.80    Fatty liver K76.0    History of cardiac cath Z98.890    Diverticulitis of colon K57.32       The PSH, FH were reviewed. SH:  Social History     Tobacco Use    Smoking status: Never    Smokeless tobacco: Never   Vaping Use    Vaping Use: Never used   Substance Use Topics    Alcohol use: Yes    Drug use: Not Currently       Medications/Allergies:  Current Outpatient Medications on File Prior to Visit   Medication Sig Dispense Refill    MAGNESIUM PO Take 1 Tablet by mouth daily. amLODIPine-benazepril (LOTREL) 10-40 mg per capsule Take 1 Capsule by mouth daily. 90 Capsule 1    OTHER daily. Joint supplement      cholecalciferol (VITAMIN D3) (5000 Units/125 mcg) tab tablet Take 5,000 Units by mouth daily. ascorbic acid, vitamin C, (VITAMIN C) 1,000 mg tablet Take 1,000 mg by mouth daily. cyanocobalamin 1,000 mcg tablet Take 1,000 mcg by mouth daily. KRILL OIL PO Take 1,000 mg by mouth daily. No current facility-administered medications on file prior to visit.         Allergies   Allergen Reactions    Egg Hives       Objective:  Visit Vitals  /76 (BP 1 Location: Left upper arm, BP Patient Position: Sitting)   Pulse 84   Temp 97.3 °F (36.3 °C) (Temporal)   Resp 16   Ht 5' 9\" (1.753 m)   Wt 300 lb 6.4 oz (136.3 kg)   SpO2 95%   BMI 44.36 kg/m²    Body mass index is 44.36 kg/m². Physical assessment  Physical Exam  Constitutional:       General: He is not in acute distress. Appearance: He is obese. He is not toxic-appearing. Cardiovascular:      Rate and Rhythm: Normal rate and regular rhythm. Pulses: Normal pulses. Heart sounds: Normal heart sounds. Pulmonary:      Effort: Pulmonary effort is normal.      Breath sounds: Normal breath sounds. Abdominal:      General: Bowel sounds are normal.      Palpations: Abdomen is soft. Comments: Healing trochar sites to abdomen   Musculoskeletal:         General: Tenderness present. Comments: Right knee   Neurological:      General: No focal deficit present. Mental Status: He is alert and oriented to person, place, and time. Labwork and Ancillary Studies:    CBC w/Diff  Lab Results   Component Value Date/Time    WBC 10.0 07/28/2022 09:48 AM    HGB 15.4 07/28/2022 09:48 AM    PLATELET 528 (H) 06/30/2439 09:48 AM         Basic Metabolic Profile  Lab Results   Component Value Date/Time    Sodium 138 08/22/2022 08:53 AM    Potassium 4.7 08/22/2022 08:53 AM    Chloride 103 08/22/2022 08:53 AM    CO2 28 08/22/2022 08:53 AM    Anion gap 7 08/22/2022 08:53 AM    Glucose 97 08/22/2022 08:53 AM    BUN 14 08/22/2022 08:53 AM    Creatinine 0.99 08/22/2022 08:53 AM    BUN/Creatinine ratio 14 08/22/2022 08:53 AM    GFR est AA >60 08/22/2022 08:53 AM    GFR est non-AA >60 08/22/2022 08:53 AM    Calcium 9.2 08/22/2022 08:53 AM        Cholesterol  Lab Results   Component Value Date/Time    Cholesterol, total 136 08/22/2022 08:53 AM    HDL Cholesterol 38 (L) 08/22/2022 08:53 AM    LDL, calculated 82.8 08/22/2022 08:53 AM    Triglyceride 76 08/22/2022 08:53 AM    CHOL/HDL Ratio 3.6 08/22/2022 08:53 AM           I have discussed the diagnosis with the patient and the intended plan as seen in the above orders.   The patient has received an After-Visit Summary and questions were answered concerning future plans. An After Visit Summary was printed and given to the patient. All diagnosis have been discussed with the patient and all of the patient's questions have been answered. Follow-up and Dispositions    Return in about 4 months (around 4/1/2023). KELVIN Lam  [address]  [address]

## 2022-12-01 NOTE — PROGRESS NOTES
Patient will like an orthopedic referral for knee. Patient do not remember the date of Flu vaccine. Patient do not have the 3rd covid vaccine. Sonia Powell presents today for   Chief Complaint   Patient presents with    Hospital Follow Up    Thyroid Problem     Nodules    Knee Pain       Is someone accompanying this pt? Yes     Is the patient using any DME equipment during OV? no    Depression Screening:  3 most recent PHQ Screens 12/1/2022   Little interest or pleasure in doing things Not at all   Feeling down, depressed, irritable, or hopeless Not at all   Total Score PHQ 2 0       Learning Assessment:  Learning Assessment 10/13/2022   PRIMARY LEARNER Patient   HIGHEST LEVEL OF EDUCATION - PRIMARY LEARNER  -   BARRIERS PRIMARY LEARNER -   CO-LEARNER CAREGIVER -   PRIMARY LANGUAGE ENGLISH   LEARNER PREFERENCE PRIMARY DEMONSTRATION   ANSWERED BY patient   RELATIONSHIP SELF       Health Maintenance reviewed and discussed and ordered per Provider. Health Maintenance Due   Topic Date Due    COVID-19 Vaccine (3 - Booster for Anival series) 03/22/2022    Flu Vaccine (1) 08/01/2022   . Coordination of Care:  1. Have you been to the ER, urgent care clinic since your last visit? Hospitalized since your last visit? Yes [facility] 10/27 Diverticulitis     2. Have you seen or consulted any other health care providers outside of the [address] [place] [address] since your last visit? Include any pap smears or colon screening. Yes       3. For patients aged 39-70: Has the patient had a colonoscopy / FIT/ Cologuard? Yes - no Care Gap present      If the patient is female:    4. For patients aged 41-77: Has the patient had a mammogram within the past 2 years? NA - based on age or sex      11. For patients aged 21-65: Has the patient had a pap smear?  NA - based on age or sex

## 2022-12-20 ENCOUNTER — HOSPITAL ENCOUNTER (OUTPATIENT)
Dept: ULTRASOUND IMAGING | Age: 47
Discharge: HOME | End: 2022-12-20
Attending: NURSE PRACTITIONER
Payer: COMMERCIAL

## 2022-12-20 DIAGNOSIS — E04.2 MULTIPLE THYROID NODULES: ICD-10-CM

## 2022-12-20 PROCEDURE — 76536 US EXAM OF HEAD AND NECK: CPT

## 2022-12-27 ENCOUNTER — TELEPHONE (OUTPATIENT)
Dept: FAMILY MEDICINE CLINIC | Age: 47
End: 2022-12-27

## 2022-12-27 DIAGNOSIS — E04.2 MULTIPLE THYROID NODULES: Primary | ICD-10-CM

## 2022-12-29 PROBLEM — E04.2 MULTIPLE THYROID NODULES: Status: ACTIVE | Noted: 2022-12-29

## 2022-12-29 NOTE — TELEPHONE ENCOUNTER
Patient notified of thyroid ultrasound results showing thyroid is mildly enlarged and multiple nodules that radiologist recommends to complete thyroid ultrasound in 1 year. Pt will try and find outside results from scan through work. Pt is in agreement for 1 year follow up thyroid ultrasound.

## 2023-02-09 RX ORDER — AMLODIPINE BESYLATE AND BENAZEPRIL HYDROCHLORIDE 10; 40 MG/1; MG/1
1 CAPSULE ORAL DAILY
Qty: 90 CAPSULE | Refills: 1 | Status: SHIPPED | OUTPATIENT
Start: 2023-02-09

## 2023-02-20 ENCOUNTER — NURSE ONLY (OUTPATIENT)
Facility: CLINIC | Age: 48
End: 2023-02-20

## 2023-02-20 ENCOUNTER — HOSPITAL ENCOUNTER (OUTPATIENT)
Facility: HOSPITAL | Age: 48
Setting detail: SPECIMEN
Discharge: HOME | End: 2023-02-23
Payer: COMMERCIAL

## 2023-02-20 DIAGNOSIS — E53.8 B12 DEFICIENCY: ICD-10-CM

## 2023-02-20 DIAGNOSIS — E78.6 LOW HDL (UNDER 40): ICD-10-CM

## 2023-02-20 DIAGNOSIS — R73.03 PREDIABETES: ICD-10-CM

## 2023-02-20 DIAGNOSIS — Z13.89 SCREENING FOR HEMATURIA OR PROTEINURIA: ICD-10-CM

## 2023-02-20 DIAGNOSIS — Z13.0 SCREENING FOR DEFICIENCY ANEMIA: ICD-10-CM

## 2023-02-20 DIAGNOSIS — K57.32 DIVERTICULITIS OF COLON: ICD-10-CM

## 2023-02-20 DIAGNOSIS — E04.2 MULTIPLE THYROID NODULES: ICD-10-CM

## 2023-02-20 DIAGNOSIS — I10 ESSENTIAL (PRIMARY) HYPERTENSION: Primary | ICD-10-CM

## 2023-02-20 DIAGNOSIS — E55.9 VITAMIN D DEFICIENCY: ICD-10-CM

## 2023-02-20 LAB
25(OH)D3 SERPL-MCNC: 62.9 NG/ML (ref 30–100)
ALBUMIN SERPL-MCNC: 4.3 G/DL (ref 3.4–5)
ALBUMIN/GLOB SERPL: 1.3 (ref 0.8–1.7)
ALP SERPL-CCNC: 53 U/L (ref 45–117)
ALT SERPL-CCNC: 28 U/L (ref 16–61)
ANION GAP SERPL CALC-SCNC: 7 MMOL/L (ref 3–18)
APPEARANCE UR: CLEAR
AST SERPL-CCNC: 17 U/L (ref 10–38)
BILIRUB SERPL-MCNC: 0.6 MG/DL (ref 0.2–1)
BILIRUB UR QL: NEGATIVE
BUN SERPL-MCNC: 15 MG/DL (ref 7–18)
BUN/CREAT SERPL: 17 (ref 12–20)
CALCIUM SERPL-MCNC: 9.1 MG/DL (ref 8.5–10.1)
CHLORIDE SERPL-SCNC: 105 MMOL/L (ref 100–111)
CHOLEST SERPL-MCNC: 161 MG/DL
CO2 SERPL-SCNC: 23 MMOL/L (ref 21–32)
COLOR UR: YELLOW
CREAT SERPL-MCNC: 0.89 MG/DL (ref 0.6–1.3)
ERYTHROCYTE [DISTWIDTH] IN BLOOD BY AUTOMATED COUNT: 13.8 % (ref 11.6–14.5)
EST. AVERAGE GLUCOSE BLD GHB EST-MCNC: 114 MG/DL
GLOBULIN SER CALC-MCNC: 3.2 G/DL (ref 2–4)
GLUCOSE SERPL-MCNC: 102 MG/DL (ref 74–99)
GLUCOSE UR STRIP.AUTO-MCNC: NEGATIVE MG/DL
HBA1C MFR BLD: 5.6 % (ref 4.2–5.6)
HCT VFR BLD AUTO: 45.2 % (ref 36–48)
HDLC SERPL-MCNC: 50 MG/DL (ref 40–60)
HDLC SERPL: 3.2 (ref 0–5)
HGB BLD-MCNC: 15.1 G/DL (ref 13–16)
HGB UR QL STRIP: NEGATIVE
KETONES UR QL STRIP.AUTO: ABNORMAL MG/DL
LDLC SERPL CALC-MCNC: 91 MG/DL (ref 0–100)
LEUKOCYTE ESTERASE UR QL STRIP.AUTO: NEGATIVE
LIPID PANEL: NORMAL
MCH RBC QN AUTO: 27.6 PG (ref 24–34)
MCHC RBC AUTO-ENTMCNC: 33.4 G/DL (ref 31–37)
MCV RBC AUTO: 82.6 FL (ref 78–100)
NITRITE UR QL STRIP.AUTO: NEGATIVE
NRBC # BLD: 0 K/UL (ref 0–0.01)
NRBC BLD-RTO: 0 PER 100 WBC
PH UR STRIP: 5.5 (ref 5–8)
PLATELET # BLD AUTO: 402 K/UL (ref 135–420)
PMV BLD AUTO: 9.6 FL (ref 9.2–11.8)
POTASSIUM SERPL-SCNC: 4.2 MMOL/L (ref 3.5–5.5)
PROT SERPL-MCNC: 7.5 G/DL (ref 6.4–8.2)
PROT UR STRIP-MCNC: NEGATIVE MG/DL
RBC # BLD AUTO: 5.47 M/UL (ref 4.35–5.65)
SODIUM SERPL-SCNC: 135 MMOL/L (ref 136–145)
SP GR UR REFRACTOMETRY: 1.03 (ref 1–1.03)
TRIGL SERPL-MCNC: 100 MG/DL
TSH SERPL DL<=0.05 MIU/L-ACNC: 1.69 UIU/ML (ref 0.36–3.74)
UROBILINOGEN UR QL STRIP.AUTO: 0.2 EU/DL (ref 0.2–1)
VIT B12 SERPL-MCNC: 1456 PG/ML (ref 211–911)
VLDLC SERPL CALC-MCNC: 20 MG/DL
WBC # BLD AUTO: 9 K/UL (ref 4.6–13.2)

## 2023-02-20 PROCEDURE — 36415 COLL VENOUS BLD VENIPUNCTURE: CPT

## 2023-02-20 PROCEDURE — 80053 COMPREHEN METABOLIC PANEL: CPT

## 2023-02-20 PROCEDURE — 81003 URINALYSIS AUTO W/O SCOPE: CPT

## 2023-02-20 PROCEDURE — 85027 COMPLETE CBC AUTOMATED: CPT

## 2023-02-20 PROCEDURE — 80061 LIPID PANEL: CPT

## 2023-02-20 PROCEDURE — 83036 HEMOGLOBIN GLYCOSYLATED A1C: CPT

## 2023-02-20 PROCEDURE — 82607 VITAMIN B-12: CPT

## 2023-02-20 PROCEDURE — 82306 VITAMIN D 25 HYDROXY: CPT

## 2023-02-20 PROCEDURE — 84443 ASSAY THYROID STIM HORMONE: CPT

## 2023-02-20 RX ORDER — AMLODIPINE BESYLATE AND BENAZEPRIL HYDROCHLORIDE 10; 40 MG/1; MG/1
CAPSULE ORAL
COMMUNITY
Start: 2023-02-09

## 2023-02-20 SDOH — ECONOMIC STABILITY: HOUSING INSECURITY
IN THE LAST 12 MONTHS, WAS THERE A TIME WHEN YOU DID NOT HAVE A STEADY PLACE TO SLEEP OR SLEPT IN A SHELTER (INCLUDING NOW)?: NO

## 2023-02-20 SDOH — ECONOMIC STABILITY: FOOD INSECURITY: WITHIN THE PAST 12 MONTHS, THE FOOD YOU BOUGHT JUST DIDN'T LAST AND YOU DIDN'T HAVE MONEY TO GET MORE.: SOMETIMES TRUE

## 2023-02-20 SDOH — ECONOMIC STABILITY: FOOD INSECURITY: WITHIN THE PAST 12 MONTHS, YOU WORRIED THAT YOUR FOOD WOULD RUN OUT BEFORE YOU GOT MONEY TO BUY MORE.: SOMETIMES TRUE

## 2023-02-20 SDOH — ECONOMIC STABILITY: INCOME INSECURITY: HOW HARD IS IT FOR YOU TO PAY FOR THE VERY BASICS LIKE FOOD, HOUSING, MEDICAL CARE, AND HEATING?: NOT HARD AT ALL

## 2023-02-27 ENCOUNTER — OFFICE VISIT (OUTPATIENT)
Facility: CLINIC | Age: 48
End: 2023-02-27
Payer: COMMERCIAL

## 2023-02-27 VITALS
RESPIRATION RATE: 16 BRPM | HEIGHT: 69 IN | DIASTOLIC BLOOD PRESSURE: 70 MMHG | OXYGEN SATURATION: 95 % | BODY MASS INDEX: 46.12 KG/M2 | SYSTOLIC BLOOD PRESSURE: 108 MMHG | HEART RATE: 82 BPM | TEMPERATURE: 98 F | WEIGHT: 311.4 LBS

## 2023-02-27 DIAGNOSIS — Z63.6 CAREGIVER STRESS: ICD-10-CM

## 2023-02-27 DIAGNOSIS — E04.2 MULTIPLE THYROID NODULES: Primary | ICD-10-CM

## 2023-02-27 DIAGNOSIS — R74.8 ELEVATED VITAMIN B12 LEVEL: ICD-10-CM

## 2023-02-27 DIAGNOSIS — G89.29 CHRONIC PAIN OF LEFT KNEE: ICD-10-CM

## 2023-02-27 DIAGNOSIS — M25.562 CHRONIC PAIN OF LEFT KNEE: ICD-10-CM

## 2023-02-27 DIAGNOSIS — R73.03 PREDIABETES: ICD-10-CM

## 2023-02-27 DIAGNOSIS — I10 ESSENTIAL (PRIMARY) HYPERTENSION: ICD-10-CM

## 2023-02-27 DIAGNOSIS — K76.0 FATTY (CHANGE OF) LIVER, NOT ELSEWHERE CLASSIFIED: ICD-10-CM

## 2023-02-27 DIAGNOSIS — E55.9 VITAMIN D DEFICIENCY: ICD-10-CM

## 2023-02-27 PROBLEM — R79.89 ELEVATED VITAMIN B12 LEVEL: Status: ACTIVE | Noted: 2023-02-27

## 2023-02-27 PROCEDURE — 3074F SYST BP LT 130 MM HG: CPT | Performed by: NURSE PRACTITIONER

## 2023-02-27 PROCEDURE — 99214 OFFICE O/P EST MOD 30 MIN: CPT | Performed by: NURSE PRACTITIONER

## 2023-02-27 PROCEDURE — 3078F DIAST BP <80 MM HG: CPT | Performed by: NURSE PRACTITIONER

## 2023-02-27 RX ORDER — LANOLIN ALCOHOL/MO/W.PET/CERES
3 CREAM (GRAM) TOPICAL DAILY
COMMUNITY

## 2023-02-27 SDOH — SOCIAL STABILITY - SOCIAL INSECURITY: DEPENDENT RELATIVE NEEDING CARE AT HOME: Z63.6

## 2023-02-27 ASSESSMENT — ENCOUNTER SYMPTOMS
BLOOD IN STOOL: 0
SHORTNESS OF BREATH: 0
GASTROINTESTINAL NEGATIVE: 1
STRIDOR: 0
CHEST TIGHTNESS: 0
NAUSEA: 0
CONSTIPATION: 0

## 2023-02-27 ASSESSMENT — PATIENT HEALTH QUESTIONNAIRE - PHQ9
1. LITTLE INTEREST OR PLEASURE IN DOING THINGS: 0
SUM OF ALL RESPONSES TO PHQ QUESTIONS 1-9: 0
SUM OF ALL RESPONSES TO PHQ QUESTIONS 1-9: 0
2. FEELING DOWN, DEPRESSED OR HOPELESS: 0
SUM OF ALL RESPONSES TO PHQ9 QUESTIONS 1 & 2: 0
SUM OF ALL RESPONSES TO PHQ QUESTIONS 1-9: 0
SUM OF ALL RESPONSES TO PHQ QUESTIONS 1-9: 0

## 2023-02-27 NOTE — PROGRESS NOTES
Patient states he do not remember if he had the 3rd covid vaccine. [facility] [place] presents today for   Chief Complaint   Patient presents with    Follow-up       Is someone accompanying this pt? no    Is the patient using any DME equipment during OV? No     Depression Screening:  No flowsheet data found. Learning Assessment:  No flowsheet data found. Health Maintenance reviewed and discussed and ordered per Provider. Health Maintenance Due   Topic Date Due    HIV screen  Never done    COVID-19 Vaccine (3 - Booster for Frida series) 03/22/2022    Flu vaccine (1) 08/01/2022    Colorectal Cancer Screen  Never done   . Coordination of Care:  1. Have you been to the ER, urgent care clinic since your last visit? Hospitalized since your last visit? No     2. Have you seen or consulted any other health care providers outside of the [address] [place] [address] since your last visit? Include any pap smears or colon screening. No    3. For patients aged 39-70: Has the patient had a colonoscopy / FIT/ Cologuard? Yes Patient states he had this done at [facility]       If the patient is female:    4. For patients aged 41-77: Has the patient had a mammogram within the past 2 years? N/a      5. For patients aged 21-65: Has the patient had a pap smear?  N/a

## 2023-02-27 NOTE — PROGRESS NOTES
[address] [place] [address] [facility] Hospital[facility] [address]               [phone]      Emanuel Syed is a 41 y.o. male and presents with Follow-up       Assessment/Plan:    1. Multiple thyroid nodules  2. Body mass index (BMI) 45.0-49.9, adult ([address]  3. Essential (primary) hypertension  4. Vitamin D deficiency  5. Prediabetes  6. Elevated vitamin B12 level  7. Caregiver stress  8. Chronic pain of left knee  -     diclofenac (VOLTAREN) 50 MG EC tablet; Take 1 tablet by mouth 3 times daily as needed for Pain, Disp-60 tablet, R-3Normal  9. Fatty (change of) liver, not elsewhere classified       Follow up and disposition:   Return in about 6 months (around 8/27/2023). Health Maintenance:   Health Maintenance   Topic Date Due    HIV screen  Never done    COVID-19 Vaccine (3 - Booster for Frida series) 03/22/2022    Flu vaccine (1) 08/01/2022    Colorectal Cancer Screen  Never done    A1C test (Diabetic or Prediabetic)  02/20/2024    Depression Screen  02/27/2024    Lipids  02/20/2028    DTaP/Tdap/Td vaccine (4 - Td or Tdap) 05/16/2032    Hepatitis C screen  Completed    Hepatitis A vaccine  Aged Out    Hib vaccine  Aged Out    Meningococcal (ACWY) vaccine  Aged Out    Pneumococcal 0-64 years Vaccine  Aged Out        Subjective:    Labs obtained prior to visit? Yes  Reviewed with patient? yes    Injection 12/2022 right knee and shoulder injection   --needs right knee replacement    Seeing Urology, cardiology and ortho     ROS:     Review of Systems   Constitutional:  Negative for appetite change, chills, fatigue and fever. HENT: Negative. Respiratory:  Negative for chest tightness, shortness of breath and stridor. Cardiovascular: Negative. Negative for chest pain, palpitations and leg swelling. Gastrointestinal: Negative. Negative for blood in stool, constipation and nausea. Genitourinary: Negative. Musculoskeletal:  Positive for arthralgias.         Right knee pain shoulder pain Neurological: Negative. Hematological: Negative. Psychiatric/Behavioral: Negative. Negative for sleep disturbance. The problem list was updated as a part of today's visit. Patient Active Problem List   Diagnosis    Essential (primary) hypertension    Obesity, unspecified    Pain in left knee    Spontaneous rupture of other tendons, right upper arm    Diverticulitis    Perforated diverticulum    Fatty (change of) liver, not elsewhere classified    History of cardiac cath    Diverticulitis of colon    Multiple thyroid nodules    Body mass index (BMI) 45.0-49.9, adult (HCC)    Elevated vitamin B12 level    Prediabetes    Vitamin D deficiency    Caregiver stress       The PSH, FH were reviewed. SH:  Social History     Tobacco Use    Smoking status: Never    Smokeless tobacco: Never   Substance Use Topics    Alcohol use: Yes    Drug use: Not Currently       Medications/Allergies:  Current Outpatient Medications on File Prior to Visit   Medication Sig Dispense Refill    melatonin 3 MG TABS tablet Take 3 mg by mouth daily      KRILL OIL PO Take 1,000 mg by mouth daily      MAGNESIUM PO Take 1 tablet by mouth daily      amLODIPine-benazepril (LOTREL) 10-40 MG per capsule take 1 capsule by mouth once daily      ascorbic acid (VITAMIN C) 1000 MG tablet Take 1,000 mg by mouth daily      vitamin D3 (CHOLECALCIFEROL) 125 MCG (5000 UT) TABS tablet Take 5,000 Units by mouth daily       No current facility-administered medications on file prior to visit. Allergies   Allergen Reactions    Egg White (Egg Protein) Hives       Objective:  /70   Pulse 82   Temp 98 °F (36.7 °C) (Temporal)   Resp 16   Ht 5' 9\" (1.753 m)   Wt (!) 311 lb 6.4 oz (141.3 kg)   SpO2 95%   BMI 45.99 kg/m²  Body mass index is 45.99 kg/m². Physical assessment  Physical Exam  Constitutional:       General: He is not in acute distress. Appearance: He is obese. He is not toxic-appearing.    Cardiovascular:      Rate and Rhythm: Normal rate and regular rhythm. Pulses: Normal pulses. Heart sounds: Normal heart sounds. No murmur heard. Pulmonary:      Effort: Pulmonary effort is normal. No respiratory distress. Breath sounds: Normal breath sounds. No wheezing. Abdominal:      General: Bowel sounds are normal.      Palpations: Abdomen is soft. Musculoskeletal:         General: Tenderness present. No swelling. Right lower leg: No edema. Left lower leg: No edema. Comments: Left knee   Neurological:      General: No focal deficit present. Mental Status: He is alert and oriented to person, place, and time. Psychiatric:         Mood and Affect: Mood normal.               I have discussed the diagnosis with the patient and the intended plan as seen in the above orders. The patient has received an After-Visit Summary and questions were answered concerning future plans. An After Visit Summary was printed and given to the patient. All diagnosis have been discussed with the patient and all of the patient's questions have been answered. Myrna Najera NP-C  [address]  [id number]

## 2023-08-01 DIAGNOSIS — R73.03 PREDIABETES: ICD-10-CM

## 2023-08-01 DIAGNOSIS — E04.2 MULTIPLE THYROID NODULES: Primary | ICD-10-CM

## 2023-08-01 DIAGNOSIS — E78.6 LOW HDL (UNDER 40): ICD-10-CM

## 2023-08-01 DIAGNOSIS — Z13.89 SCREENING FOR HEMATURIA OR PROTEINURIA: ICD-10-CM

## 2023-08-01 DIAGNOSIS — K76.0 FATTY (CHANGE OF) LIVER, NOT ELSEWHERE CLASSIFIED: ICD-10-CM

## 2023-08-01 DIAGNOSIS — I10 ESSENTIAL (PRIMARY) HYPERTENSION: ICD-10-CM

## 2023-08-14 RX ORDER — AMLODIPINE AND BENAZEPRIL HYDROCHLORIDE 10; 40 MG/1; MG/1
CAPSULE ORAL
Qty: 90 CAPSULE | Refills: 0 | Status: SHIPPED | OUTPATIENT
Start: 2023-08-14

## 2023-08-21 ENCOUNTER — HOSPITAL ENCOUNTER (OUTPATIENT)
Facility: HOSPITAL | Age: 48
Setting detail: SPECIMEN
Discharge: HOME | End: 2023-08-24
Payer: COMMERCIAL

## 2023-08-21 LAB
ALBUMIN SERPL-MCNC: 4.1 G/DL (ref 3.4–5)
ALBUMIN/GLOB SERPL: 1.3 (ref 0.8–1.7)
ALP SERPL-CCNC: 65 U/L (ref 45–117)
ALT SERPL-CCNC: 41 U/L (ref 16–61)
ANION GAP SERPL CALC-SCNC: 9 MMOL/L (ref 3–18)
APPEARANCE UR: CLEAR
AST SERPL-CCNC: 17 U/L (ref 10–38)
BILIRUB SERPL-MCNC: 0.3 MG/DL (ref 0.2–1)
BILIRUB UR QL: NEGATIVE
BUN SERPL-MCNC: 14 MG/DL (ref 7–18)
BUN/CREAT SERPL: 16 (ref 12–20)
CALCIUM SERPL-MCNC: 8.4 MG/DL (ref 8.5–10.1)
CHLORIDE SERPL-SCNC: 103 MMOL/L (ref 100–111)
CHOLEST SERPL-MCNC: 169 MG/DL
CO2 SERPL-SCNC: 25 MMOL/L (ref 21–32)
COLOR UR: YELLOW
CREAT SERPL-MCNC: 0.89 MG/DL (ref 0.6–1.3)
EST. AVERAGE GLUCOSE BLD GHB EST-MCNC: 111 MG/DL
GLOBULIN SER CALC-MCNC: 3.2 G/DL (ref 2–4)
GLUCOSE SERPL-MCNC: 94 MG/DL (ref 74–99)
GLUCOSE UR STRIP.AUTO-MCNC: NEGATIVE MG/DL
HBA1C MFR BLD: 5.5 % (ref 4.2–5.6)
HDLC SERPL-MCNC: 47 MG/DL (ref 40–60)
HDLC SERPL: 3.6 (ref 0–5)
HGB UR QL STRIP: NEGATIVE
KETONES UR QL STRIP.AUTO: NEGATIVE MG/DL
LDLC SERPL CALC-MCNC: 103.8 MG/DL (ref 0–100)
LEUKOCYTE ESTERASE UR QL STRIP.AUTO: NEGATIVE
LIPID PANEL: ABNORMAL
NITRITE UR QL STRIP.AUTO: NEGATIVE
PH UR STRIP: 5.5 (ref 5–8)
POTASSIUM SERPL-SCNC: 4.4 MMOL/L (ref 3.5–5.5)
PROT SERPL-MCNC: 7.3 G/DL (ref 6.4–8.2)
PROT UR STRIP-MCNC: NEGATIVE MG/DL
SODIUM SERPL-SCNC: 137 MMOL/L (ref 136–145)
SP GR UR REFRACTOMETRY: 1.01 (ref 1–1.03)
TRIGL SERPL-MCNC: 91 MG/DL
TSH SERPL DL<=0.05 MIU/L-ACNC: 1.36 UIU/ML (ref 0.36–3.74)
UROBILINOGEN UR QL STRIP.AUTO: 0.2 EU/DL (ref 0.2–1)
VLDLC SERPL CALC-MCNC: 18.2 MG/DL

## 2023-08-21 PROCEDURE — 36415 COLL VENOUS BLD VENIPUNCTURE: CPT

## 2023-08-21 PROCEDURE — 80061 LIPID PANEL: CPT

## 2023-08-21 PROCEDURE — 81003 URINALYSIS AUTO W/O SCOPE: CPT

## 2023-08-21 PROCEDURE — 84443 ASSAY THYROID STIM HORMONE: CPT

## 2023-08-21 PROCEDURE — 83036 HEMOGLOBIN GLYCOSYLATED A1C: CPT

## 2023-08-21 PROCEDURE — 80053 COMPREHEN METABOLIC PANEL: CPT

## 2023-08-28 ENCOUNTER — OFFICE VISIT (OUTPATIENT)
Facility: CLINIC | Age: 48
End: 2023-08-28
Payer: COMMERCIAL

## 2023-08-28 VITALS
TEMPERATURE: 97.6 F | HEIGHT: 69 IN | OXYGEN SATURATION: 98 % | RESPIRATION RATE: 17 BRPM | BODY MASS INDEX: 46.65 KG/M2 | WEIGHT: 315 LBS | SYSTOLIC BLOOD PRESSURE: 130 MMHG | DIASTOLIC BLOOD PRESSURE: 76 MMHG | HEART RATE: 85 BPM

## 2023-08-28 DIAGNOSIS — K57.32 DIVERTICULITIS OF COLON: ICD-10-CM

## 2023-08-28 DIAGNOSIS — E04.2 MULTIPLE THYROID NODULES: ICD-10-CM

## 2023-08-28 DIAGNOSIS — M25.562 CHRONIC PAIN OF LEFT KNEE: ICD-10-CM

## 2023-08-28 DIAGNOSIS — K76.0 FATTY (CHANGE OF) LIVER, NOT ELSEWHERE CLASSIFIED: ICD-10-CM

## 2023-08-28 DIAGNOSIS — I10 ESSENTIAL (PRIMARY) HYPERTENSION: Primary | ICD-10-CM

## 2023-08-28 DIAGNOSIS — G89.29 CHRONIC PAIN OF LEFT KNEE: ICD-10-CM

## 2023-08-28 PROBLEM — R73.03 PREDIABETES: Status: RESOLVED | Noted: 2023-02-27 | Resolved: 2023-08-28

## 2023-08-28 PROBLEM — Z63.6 CAREGIVER STRESS: Status: RESOLVED | Noted: 2023-02-27 | Resolved: 2023-08-28

## 2023-08-28 PROBLEM — M19.90 OSTEOARTHROSIS: Status: ACTIVE | Noted: 2023-08-28

## 2023-08-28 PROCEDURE — 99214 OFFICE O/P EST MOD 30 MIN: CPT | Performed by: NURSE PRACTITIONER

## 2023-08-28 PROCEDURE — 3078F DIAST BP <80 MM HG: CPT | Performed by: NURSE PRACTITIONER

## 2023-08-28 PROCEDURE — 3074F SYST BP LT 130 MM HG: CPT | Performed by: NURSE PRACTITIONER

## 2023-08-28 RX ORDER — TRAMADOL HYDROCHLORIDE 50 MG/1
50 TABLET ORAL DAILY PRN
Qty: 14 TABLET | Refills: 0 | Status: SHIPPED | OUTPATIENT
Start: 2023-08-28 | End: 2023-09-11

## 2023-08-28 RX ORDER — AMLODIPINE AND BENAZEPRIL HYDROCHLORIDE 10; 40 MG/1; MG/1
1 CAPSULE ORAL DAILY
Qty: 90 CAPSULE | Refills: 1 | Status: SHIPPED | OUTPATIENT
Start: 2023-08-28

## 2023-08-28 NOTE — PROGRESS NOTES
Cat Choe is a 41 y.o. male and presents with No chief complaint on file. Assessment/Plan:    1. Essential (primary) hypertension  Comments:  controlled on Lotrel  Orders:  -     amLODIPine-benazepril (LOTREL) 10-40 MG per capsule; Take 1 capsule by mouth daily, Disp-90 capsule, R-1Normal  2. Fatty (change of) liver, not elsewhere classified  3. Multiple thyroid nodules  Comments:  repeat thyroid ultrasound 12/2023  Orders:  -     US THYROID; Future  4. Chronic pain of left knee  Comments:  seeing ortho; plan for knee replacement next year   Orders:  -     traMADol (ULTRAM) 50 MG tablet; Take 1 tablet by mouth daily as needed for Pain for up to 14 days. Intended supply: 5 days. Take lowest dose possible to manage pain Max Daily Amount: 50 mg, Disp-14 tablet, R-0Normal  5. Diverticulitis of colon  Comments:  no recent flares since colectomy surgery   6. Body mass index (BMI) 45.0-49.9, adult [facility])  Comments:  offered bariatric referral--will wait and speak with wife          Follow up and disposition:   Return in about 6 months (around 2/28/2024). Subjective:    Labs obtained prior to visit? Yes  Reviewed with patient? 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 HENT: Negative. Respiratory:  Negative for chest tightness, shortness of breath and stridor. Cardiovascular: Negative. Negative for chest pain, palpitations and leg swelling. Gastrointestinal: Negative. Negative for blood in stool, constipation and nausea. Genitourinary: Negative. Musculoskeletal:  Positive for arthralgias. Right knee pain      Neurological: Negative. Hematological: Negative. Psychiatric/Behavioral: Negative. Negative for sleep disturbance. The problem list was updated as a part of today's visit.   Patient Active Problem List

## 2023-08-30 ASSESSMENT — ENCOUNTER SYMPTOMS
SHORTNESS OF BREATH: 0
NAUSEA: 0
CHEST TIGHTNESS: 0
BLOOD IN STOOL: 0
STRIDOR: 0
GASTROINTESTINAL NEGATIVE: 1
CONSTIPATION: 0

## 2023-10-02 ENCOUNTER — PATIENT MESSAGE (OUTPATIENT)
Facility: CLINIC | Age: 48
End: 2023-10-02

## 2023-10-02 DIAGNOSIS — G89.29 CHRONIC PAIN OF LEFT KNEE: Primary | ICD-10-CM

## 2023-10-02 DIAGNOSIS — M25.562 CHRONIC PAIN OF LEFT KNEE: Primary | ICD-10-CM

## 2023-10-05 RX ORDER — TRAMADOL HYDROCHLORIDE 50 MG/1
50 TABLET ORAL EVERY 8 HOURS PRN
Qty: 20 TABLET | Refills: 0 | Status: SHIPPED | OUTPATIENT
Start: 2023-10-05 | End: 2023-10-12

## 2023-11-17 DIAGNOSIS — I10 ESSENTIAL (PRIMARY) HYPERTENSION: ICD-10-CM

## 2023-11-17 RX ORDER — AMLODIPINE AND BENAZEPRIL HYDROCHLORIDE 10; 40 MG/1; MG/1
1 CAPSULE ORAL DAILY
Qty: 90 CAPSULE | Refills: 1 | Status: SHIPPED | OUTPATIENT
Start: 2023-11-17

## 2023-12-26 DIAGNOSIS — U07.1 POSITIVE SELF-ADMINISTERED ANTIGEN TEST FOR COVID-19: Primary | ICD-10-CM

## 2023-12-26 NOTE — PROGRESS NOTES
Paxlovid sent to pharmacy for positive self-administer covid-19 test. Pt messaged through [employer]. Electrodesiccation And Curettage Text: The wound bed was treated with electrodesiccation and curettage after the biopsy was performed.

## 2024-02-29 ENCOUNTER — HOSPITAL ENCOUNTER (OUTPATIENT)
Facility: HOSPITAL | Age: 49
Setting detail: SPECIMEN
Discharge: HOME | End: 2024-02-29
Payer: COMMERCIAL

## 2024-02-29 ENCOUNTER — OFFICE VISIT (OUTPATIENT)
Facility: CLINIC | Age: 49
End: 2024-02-29
Payer: COMMERCIAL

## 2024-02-29 VITALS
BODY MASS INDEX: 46.65 KG/M2 | DIASTOLIC BLOOD PRESSURE: 80 MMHG | OXYGEN SATURATION: 96 % | HEIGHT: 69 IN | WEIGHT: 315 LBS | RESPIRATION RATE: 16 BRPM | TEMPERATURE: 98.3 F | HEART RATE: 78 BPM | SYSTOLIC BLOOD PRESSURE: 127 MMHG

## 2024-02-29 DIAGNOSIS — E66.01 CLASS 3 SEVERE OBESITY DUE TO EXCESS CALORIES WITH SERIOUS COMORBIDITY AND BODY MASS INDEX (BMI) OF 50.0 TO 59.9 IN ADULT (HCC): ICD-10-CM

## 2024-02-29 DIAGNOSIS — Z87.19 HISTORY OF DIVERTICULITIS: ICD-10-CM

## 2024-02-29 DIAGNOSIS — M25.561 CHRONIC PAIN OF RIGHT KNEE: ICD-10-CM

## 2024-02-29 DIAGNOSIS — I10 ESSENTIAL (PRIMARY) HYPERTENSION: ICD-10-CM

## 2024-02-29 DIAGNOSIS — E55.9 VITAMIN D DEFICIENCY: ICD-10-CM

## 2024-02-29 DIAGNOSIS — E78.6 LOW HDL (UNDER 40): ICD-10-CM

## 2024-02-29 DIAGNOSIS — Z87.898 HISTORY OF PREDIABETES: ICD-10-CM

## 2024-02-29 DIAGNOSIS — E04.2 MULTIPLE THYROID NODULES: Primary | ICD-10-CM

## 2024-02-29 DIAGNOSIS — Z13.89 SCREENING FOR HEMATURIA OR PROTEINURIA: ICD-10-CM

## 2024-02-29 DIAGNOSIS — K76.0 FATTY (CHANGE OF) LIVER, NOT ELSEWHERE CLASSIFIED: ICD-10-CM

## 2024-02-29 DIAGNOSIS — G89.29 CHRONIC PAIN OF RIGHT KNEE: ICD-10-CM

## 2024-02-29 DIAGNOSIS — Z12.5 SCREENING PSA (PROSTATE SPECIFIC ANTIGEN): ICD-10-CM

## 2024-02-29 DIAGNOSIS — E04.2 MULTIPLE THYROID NODULES: ICD-10-CM

## 2024-02-29 PROBLEM — M25.562 PAIN IN LEFT KNEE: Status: RESOLVED | Noted: 2019-02-04 | Resolved: 2024-02-29

## 2024-02-29 PROBLEM — K57.80 PERFORATED DIVERTICULUM: Status: RESOLVED | Noted: 2022-04-03 | Resolved: 2024-02-29

## 2024-02-29 PROBLEM — M66.821: Status: RESOLVED | Noted: 2019-02-04 | Resolved: 2024-02-29

## 2024-02-29 PROBLEM — K57.32 DIVERTICULITIS OF COLON: Status: RESOLVED | Noted: 2022-10-27 | Resolved: 2024-02-29

## 2024-02-29 LAB
ALBUMIN SERPL-MCNC: 4.4 G/DL (ref 3.4–5)
ALBUMIN/GLOB SERPL: 1.4 (ref 0.8–1.7)
ALP SERPL-CCNC: 53 U/L (ref 45–117)
ALT SERPL-CCNC: 38 U/L (ref 16–61)
ANION GAP SERPL CALC-SCNC: 5 MMOL/L (ref 3–18)
APPEARANCE UR: CLEAR
AST SERPL-CCNC: 17 U/L (ref 10–38)
BILIRUB SERPL-MCNC: 0.7 MG/DL (ref 0.2–1)
BILIRUB UR QL: NEGATIVE
BUN SERPL-MCNC: 17 MG/DL (ref 7–18)
BUN/CREAT SERPL: 19 (ref 12–20)
CALCIUM SERPL-MCNC: 9.2 MG/DL (ref 8.5–10.1)
CHLORIDE SERPL-SCNC: 107 MMOL/L (ref 100–111)
CHOLEST SERPL-MCNC: 180 MG/DL
CO2 SERPL-SCNC: 28 MMOL/L (ref 21–32)
COLOR UR: YELLOW
CREAT SERPL-MCNC: 0.9 MG/DL (ref 0.6–1.3)
EST. AVERAGE GLUCOSE BLD GHB EST-MCNC: 117 MG/DL
GLOBULIN SER CALC-MCNC: 3.2 G/DL (ref 2–4)
GLUCOSE SERPL-MCNC: 99 MG/DL (ref 74–99)
GLUCOSE UR STRIP.AUTO-MCNC: NEGATIVE MG/DL
HBA1C MFR BLD: 5.7 % (ref 4.2–5.6)
HDLC SERPL-MCNC: 44 MG/DL (ref 40–60)
HDLC SERPL: 4.1 (ref 0–5)
HGB UR QL STRIP: NEGATIVE
KETONES UR QL STRIP.AUTO: NEGATIVE MG/DL
LDLC SERPL CALC-MCNC: 109.6 MG/DL (ref 0–100)
LEUKOCYTE ESTERASE UR QL STRIP.AUTO: NEGATIVE
LIPID PANEL: ABNORMAL
NITRITE UR QL STRIP.AUTO: NEGATIVE
PH UR STRIP: 7.5 (ref 5–8)
POTASSIUM SERPL-SCNC: 4.5 MMOL/L (ref 3.5–5.5)
PROT SERPL-MCNC: 7.6 G/DL (ref 6.4–8.2)
PROT UR STRIP-MCNC: NEGATIVE MG/DL
PSA SERPL-MCNC: 0.7 NG/ML (ref 0–4)
SODIUM SERPL-SCNC: 140 MMOL/L (ref 136–145)
SP GR UR REFRACTOMETRY: 1.02 (ref 1–1.03)
T4 SERPL-MCNC: 9.5 UG/DL (ref 4.5–12.1)
TRIGL SERPL-MCNC: 132 MG/DL
TSH SERPL DL<=0.05 MIU/L-ACNC: 1.25 UIU/ML (ref 0.36–3.74)
UROBILINOGEN UR QL STRIP.AUTO: 0.2 EU/DL (ref 0.2–1)
VLDLC SERPL CALC-MCNC: 26.4 MG/DL

## 2024-02-29 PROCEDURE — 81003 URINALYSIS AUTO W/O SCOPE: CPT

## 2024-02-29 PROCEDURE — 84443 ASSAY THYROID STIM HORMONE: CPT

## 2024-02-29 PROCEDURE — 3074F SYST BP LT 130 MM HG: CPT | Performed by: NURSE PRACTITIONER

## 2024-02-29 PROCEDURE — 80053 COMPREHEN METABOLIC PANEL: CPT

## 2024-02-29 PROCEDURE — 99214 OFFICE O/P EST MOD 30 MIN: CPT | Performed by: NURSE PRACTITIONER

## 2024-02-29 PROCEDURE — G0103 PSA SCREENING: HCPCS

## 2024-02-29 PROCEDURE — 84480 ASSAY TRIIODOTHYRONINE (T3): CPT

## 2024-02-29 PROCEDURE — 36415 COLL VENOUS BLD VENIPUNCTURE: CPT

## 2024-02-29 PROCEDURE — 84436 ASSAY OF TOTAL THYROXINE: CPT

## 2024-02-29 PROCEDURE — 3079F DIAST BP 80-89 MM HG: CPT | Performed by: NURSE PRACTITIONER

## 2024-02-29 PROCEDURE — 80061 LIPID PANEL: CPT

## 2024-02-29 PROCEDURE — 83036 HEMOGLOBIN GLYCOSYLATED A1C: CPT

## 2024-02-29 RX ORDER — AMLODIPINE AND BENAZEPRIL HYDROCHLORIDE 10; 40 MG/1; MG/1
1 CAPSULE ORAL DAILY
Qty: 90 CAPSULE | Refills: 1 | Status: SHIPPED | OUTPATIENT
Start: 2024-02-29

## 2024-02-29 RX ORDER — TRAMADOL HYDROCHLORIDE 50 MG/1
50 TABLET ORAL DAILY PRN
Qty: 7 TABLET | Refills: 0 | Status: SHIPPED | OUTPATIENT
Start: 2024-02-29 | End: 2024-03-07

## 2024-02-29 SDOH — ECONOMIC STABILITY: FOOD INSECURITY: WITHIN THE PAST 12 MONTHS, YOU WORRIED THAT YOUR FOOD WOULD RUN OUT BEFORE YOU GOT MONEY TO BUY MORE.: NEVER TRUE

## 2024-02-29 SDOH — ECONOMIC STABILITY: FOOD INSECURITY: WITHIN THE PAST 12 MONTHS, THE FOOD YOU BOUGHT JUST DIDN'T LAST AND YOU DIDN'T HAVE MONEY TO GET MORE.: NEVER TRUE

## 2024-02-29 SDOH — ECONOMIC STABILITY: INCOME INSECURITY: HOW HARD IS IT FOR YOU TO PAY FOR THE VERY BASICS LIKE FOOD, HOUSING, MEDICAL CARE, AND HEATING?: NOT HARD AT ALL

## 2024-02-29 ASSESSMENT — PATIENT HEALTH QUESTIONNAIRE - PHQ9
SUM OF ALL RESPONSES TO PHQ QUESTIONS 1-9: 0
SUM OF ALL RESPONSES TO PHQ QUESTIONS 1-9: 0
SUM OF ALL RESPONSES TO PHQ9 QUESTIONS 1 & 2: 0
SUM OF ALL RESPONSES TO PHQ QUESTIONS 1-9: 0
SUM OF ALL RESPONSES TO PHQ QUESTIONS 1-9: 0
1. LITTLE INTEREST OR PLEASURE IN DOING THINGS: 0
2. FEELING DOWN, DEPRESSED OR HOPELESS: 0

## 2024-02-29 ASSESSMENT — ENCOUNTER SYMPTOMS
NAUSEA: 0
STRIDOR: 0
BLOOD IN STOOL: 0
CHEST TIGHTNESS: 0
SHORTNESS OF BREATH: 0
CONSTIPATION: 0
GASTROINTESTINAL NEGATIVE: 1

## 2024-02-29 NOTE — ASSESSMENT & PLAN NOTE
Monitored by specialist- no acute findings meriting change in the plan  Seeing [facility]; possible right knee replacement 11/2024

## 2024-02-29 NOTE — PROGRESS NOTES
Juan Delfino presents today for   Chief Complaint   Patient presents with    Follow-up       Is someone accompanying this pt? no    Is the patient using any DME equipment during OV? no    Depression Screening:       No data to display                Learning Assessment:  Failed to redirect to the Timeline version of the "[employer] SmartLink.    Health Maintenance reviewed and discussed and ordered per Provider.    Health Maintenance Due   Topic Date Due    Colorectal Cancer Screen  Never done    Flu vaccine (1) 08/01/2023    COVID-19 Vaccine (3 - 2023-24 season) 09/01/2023    Depression Screen  02/27/2024   .    \"Have you been to the ER, urgent care clinic since your last visit?  Hospitalized since your last visit?\"    no    “Have you seen or consulted any other health care providers outside of [facility] since your last visit?”    no    “Have you had a colorectal cancer screening such as a colonoscopy/FIT/Cologuard?    Yes      
   KRILL OIL PO Take 1,000 mg by mouth daily      MAGNESIUM PO Take 1 tablet by mouth daily      ascorbic acid (VITAMIN C) 1000 MG tablet Take 1 tablet by mouth daily      vitamin D3 (CHOLECALCIFEROL) 125 MCG (5000 UT) TABS tablet Take 1 tablet by mouth daily       No current facility-administered medications on file prior to visit.        Allergies   Allergen Reactions    Egg White (Egg Protein) Hives       Objective:  /80   Pulse 78   Temp 98.3 °F (36.8 °C) (Temporal)   Resp 16   Ht 1.753 m (5' 9\")   Wt (!) 157.1 kg (346 lb 6.4 oz)   SpO2 96%   BMI 51.15 kg/m²  Body mass index is 51.15 kg/m².    Physical assessment  Physical Exam  Constitutional:       General: He is not in acute distress.     Appearance: He is obese. He is not toxic-appearing.   Neck:      Thyroid: No thyroid mass, thyromegaly or thyroid tenderness.   Cardiovascular:      Rate and Rhythm: Normal rate and regular rhythm.      Pulses: Normal pulses.      Heart sounds: Normal heart sounds. No murmur heard.  Pulmonary:      Effort: Pulmonary effort is normal. No respiratory distress.      Breath sounds: Normal breath sounds. No wheezing.   Abdominal:      General: Bowel sounds are normal. There is no distension.      Palpations: Abdomen is soft.      Tenderness: There is no abdominal tenderness.   Musculoskeletal:         General: Tenderness present. No swelling.      Right lower leg: No edema.      Left lower leg: No edema.      Comments: Right knee   Neurological:      General: No focal deficit present.      Mental Status: He is alert and oriented to person, place, and time.   Psychiatric:         Mood and Affect: Mood normal.                 I have discussed the diagnosis with the patient and the intended plan as seen in the above orders.  The patient has received an After-Visit Summary and questions were answered concerning future plans.     An After Visit Summary was printed and given to the patient.    All diagnosis have been

## 2024-03-02 LAB — T3 SERPL-MCNC: 111 NG/DL (ref 71–180)

## 2024-05-21 ENCOUNTER — TELEPHONE (OUTPATIENT)
Facility: CLINIC | Age: 49
End: 2024-05-21

## 2024-07-22 ENCOUNTER — OFFICE VISIT (OUTPATIENT)
Facility: CLINIC | Age: 49
End: 2024-07-22
Payer: COMMERCIAL

## 2024-07-22 VITALS
DIASTOLIC BLOOD PRESSURE: 77 MMHG | TEMPERATURE: 98.2 F | SYSTOLIC BLOOD PRESSURE: 122 MMHG | OXYGEN SATURATION: 96 % | BODY MASS INDEX: 46.65 KG/M2 | WEIGHT: 315 LBS | RESPIRATION RATE: 16 BRPM | HEART RATE: 102 BPM | HEIGHT: 69 IN

## 2024-07-22 DIAGNOSIS — L03.115 CELLULITIS OF RIGHT LOWER EXTREMITY: Primary | ICD-10-CM

## 2024-07-22 DIAGNOSIS — D72.829 LEUKOCYTOSIS, UNSPECIFIED TYPE: ICD-10-CM

## 2024-07-22 DIAGNOSIS — I10 ESSENTIAL (PRIMARY) HYPERTENSION: ICD-10-CM

## 2024-07-22 PROCEDURE — 99214 OFFICE O/P EST MOD 30 MIN: CPT | Performed by: NURSE PRACTITIONER

## 2024-07-22 PROCEDURE — 3078F DIAST BP <80 MM HG: CPT | Performed by: NURSE PRACTITIONER

## 2024-07-22 PROCEDURE — 3074F SYST BP LT 130 MM HG: CPT | Performed by: NURSE PRACTITIONER

## 2024-07-22 NOTE — PROGRESS NOTES
\"Have you been to the ER, urgent care clinic since your last visit?  Hospitalized since your last visit?\"    Yes- Urgent Care today    “Have you seen or consulted any other health care providers outside of [facility] since your last visit?”    NO      “Have you had a colorectal cancer screening such as a colonoscopy/FIT/Cologuard?    NO    No colonoscopy on file  No cologuard on file  No FIT/FOBT on file   No flexible sigmoidoscopy on file         Click Here for Release of Records Request

## 2024-07-22 NOTE — PROGRESS NOTES
[address] [place] [address]               [phone]      Juan Saleh is a 48 y.o. male and presents with Skin Problem       Assessment/Plan:    1. Cellulitis of right lower extremity  Assessment & Plan:  STARTED [facility]ACTRIM BY [facility]. MAGDA BLACK MARKER TO MELISSA SIZE. IF AREA GETS WORSE GO TO ER.    ADVISED TO CONTINUE ABX, COME TOMORROW FOR  NURSE VISIT TO RECHECK CBC AND CHECK WOUND.  2. Leukocytosis, unspecified type  -     CBC; Future  3. Essential (primary) hypertension  Assessment & Plan:   Well-controlled, continue current medications         Follow up and disposition:   Return in about 2 days (around 7/24/2024) for lab and nurse visit for wound check.      Subjective:    Labs obtained prior to visit? No  Reviewed with patient? N/A    Negative flu/strep/covid-19    ROS:     Review of Systems   Constitutional:  Negative for appetite change, chills, fatigue and fever.   HENT: Negative.     Respiratory:  Negative for chest tightness, shortness of breath and stridor.    Cardiovascular: Negative.  Negative for chest pain, palpitations and leg swelling.   Gastrointestinal: Negative.  Negative for blood in stool, constipation and nausea.   Genitourinary: Negative.    Musculoskeletal:  Negative for arthralgias.   Skin:  Positive for color change.   Neurological: Negative.    Hematological: Negative.    Psychiatric/Behavioral: Negative.  Negative for sleep disturbance.          The problem list was updated as a part of today's visit.  Patient Active Problem List   Diagnosis    Essential (primary) hypertension    Class 3 severe obesity due to excess calories with body mass index (BMI) of 50.0 to 59.9 in adult (HCC)    Chronic pain of right knee    History of diverticulitis    Fatty (change of) liver, not elsewhere classified    History of cardiac cath    Multiple thyroid nodules    Vitamin D deficiency    Osteoarthrosis    History of prediabetes    Low HDL (under 40)

## 2024-07-23 ENCOUNTER — TELEPHONE (OUTPATIENT)
Facility: CLINIC | Age: 49
End: 2024-07-23

## 2024-07-23 RX ORDER — SULFAMETHOXAZOLE AND TRIMETHOPRIM 800; 160 MG/1; MG/1
TABLET ORAL
COMMUNITY
Start: 2024-07-22 | End: 2024-07-26 | Stop reason: ALTCHOICE

## 2024-07-24 ENCOUNTER — NURSE ONLY (OUTPATIENT)
Facility: CLINIC | Age: 49
End: 2024-07-24

## 2024-07-24 DIAGNOSIS — Z51.89 VISIT FOR WOUND CHECK: Primary | ICD-10-CM

## 2024-07-24 PROBLEM — L03.90 CELLULITIS: Status: ACTIVE | Noted: 2024-07-24

## 2024-07-24 NOTE — PROGRESS NOTES
Patient arrived for nurse visit for wound check and CBC lab. Erythema has extended outside of the marked area and extended to right upper thigh. Pictures sent yesterday evening by patient did not show this marked erythema extension despite being on Bactrim BID. Advised patient to go now to the ER for need for IV antibiotics ASAP-pt in agreement.    Called [facility] and notified charge nurse, Александр that patient is on his way over now.

## 2024-07-26 ENCOUNTER — OFFICE VISIT (OUTPATIENT)
Facility: CLINIC | Age: 49
End: 2024-07-26
Payer: COMMERCIAL

## 2024-07-26 VITALS
HEART RATE: 96 BPM | SYSTOLIC BLOOD PRESSURE: 131 MMHG | HEIGHT: 69 IN | DIASTOLIC BLOOD PRESSURE: 84 MMHG | BODY MASS INDEX: 46.65 KG/M2 | RESPIRATION RATE: 16 BRPM | WEIGHT: 315 LBS | OXYGEN SATURATION: 94 % | TEMPERATURE: 97.7 F

## 2024-07-26 DIAGNOSIS — Z09 HOSPITAL DISCHARGE FOLLOW-UP: ICD-10-CM

## 2024-07-26 DIAGNOSIS — L03.115 CELLULITIS OF RIGHT LOWER EXTREMITY: Primary | ICD-10-CM

## 2024-07-26 DIAGNOSIS — I10 ESSENTIAL (PRIMARY) HYPERTENSION: ICD-10-CM

## 2024-07-26 PROCEDURE — 3075F SYST BP GE 130 - 139MM HG: CPT | Performed by: NURSE PRACTITIONER

## 2024-07-26 PROCEDURE — 3079F DIAST BP 80-89 MM HG: CPT | Performed by: NURSE PRACTITIONER

## 2024-07-26 PROCEDURE — 1111F DSCHRG MED/CURRENT MED MERGE: CPT | Performed by: NURSE PRACTITIONER

## 2024-07-26 PROCEDURE — 99214 OFFICE O/P EST MOD 30 MIN: CPT | Performed by: NURSE PRACTITIONER

## 2024-07-26 RX ORDER — DOXYCYCLINE HYCLATE 100 MG/1
100 CAPSULE ORAL 2 TIMES DAILY
COMMUNITY
Start: 2024-07-25 | End: 2024-08-01

## 2024-07-26 RX ORDER — TRAMADOL HYDROCHLORIDE 50 MG/1
50 TABLET ORAL EVERY 8 HOURS PRN
Qty: 9 TABLET | Refills: 0 | Status: SHIPPED | OUTPATIENT
Start: 2024-07-26 | End: 2024-07-29

## 2024-07-26 RX ORDER — DOXYCYCLINE HYCLATE 100 MG/1
100 CAPSULE ORAL 2 TIMES DAILY
Qty: 6 CAPSULE | Refills: 0 | Status: SHIPPED | OUTPATIENT
Start: 2024-07-26 | End: 2024-07-29

## 2024-07-26 NOTE — PROGRESS NOTES
[address] [place] [address]               [phone]      Juan Saleh is a 48 y.o. male and presents with Follow-Up from Hospital ([facility] Admit Date: 7/24/2024 Discharge Date: 7/25/24 /Dx: Cellulitis)       Assessment/Plan:    1. Cellulitis of right lower extremity  -     doxycycline hyclate (VIBRAMYCIN) 100 MG capsule; Take 1 capsule by mouth 2 times daily for 3 days, Disp-6 capsule, R-0Normal  -     traMADol (ULTRAM) 50 MG tablet; Take 1 tablet by mouth every 8 hours as needed for Pain for up to 3 days. Intended supply: 5 days. Take lowest dose possible to manage pain Max Daily Amount: 150 mg, Disp-9 tablet, R-0Normal  2. Essential (primary) hypertension  3. Hospital discharge follow-up  -     PA DISCHARGE MEDS RECONCILED W/ CURRENT OUTPATIENT MED LIST         Follow up and disposition:   No follow-ups on file.      Subjective:    Labs obtained prior to visit? Yes  Reviewed with patient? yes      --received triple ABX in the hospital and had negative xray for osteomyelitis     ROS:     Review of Systems   Constitutional:  Negative for appetite change, chills, fatigue and fever.   HENT: Negative.     Respiratory:  Negative for chest tightness, shortness of breath and stridor.    Cardiovascular: Negative.  Negative for chest pain, palpitations and leg swelling.   Gastrointestinal: Negative.  Negative for blood in stool, constipation and nausea.   Genitourinary: Negative.    Musculoskeletal:  Negative for arthralgias.   Skin:  Positive for color change and wound.   Neurological: Negative.    Hematological: Negative.    Psychiatric/Behavioral: Negative.  Negative for sleep disturbance.          The problem list was updated as a part of today's visit.  Patient Active Problem List   Diagnosis    Essential (primary) hypertension    Class 3 severe obesity due to excess calories with body mass index (BMI) of 50.0 to 59.9 in adult (HCC)    Chronic pain of right knee    History of

## 2024-07-26 NOTE — PROGRESS NOTES
\"Have you been to the ER, urgent care clinic since your last visit?  Hospitalized since your last visit?\"    Yes-[facility] Admit Date: 7/24/2024 Discharge Date: 7/25/24   Dx: Cellulitis    “Have you seen or consulted any other health care providers outside of [facility] since your last visit?”    NO          Click Here for Release of Records Request

## 2024-07-31 PROBLEM — R79.89 ELEVATED VITAMIN B12 LEVEL: Status: RESOLVED | Noted: 2023-02-27 | Resolved: 2024-07-31

## 2024-07-31 PROBLEM — L03.90 CELLULITIS: Status: RESOLVED | Noted: 2024-07-24 | Resolved: 2024-07-31

## 2024-07-31 PROBLEM — R74.8 ELEVATED VITAMIN B12 LEVEL: Status: RESOLVED | Noted: 2023-02-27 | Resolved: 2024-07-31

## 2024-07-31 ASSESSMENT — ENCOUNTER SYMPTOMS
CHEST TIGHTNESS: 0
SHORTNESS OF BREATH: 0
COLOR CHANGE: 1
BLOOD IN STOOL: 0
STRIDOR: 0
GASTROINTESTINAL NEGATIVE: 1
NAUSEA: 0
CONSTIPATION: 0

## 2024-08-01 NOTE — ASSESSMENT & PLAN NOTE
STARTED [facility]IM BY [facility]. MAGDA BLACK MARKER TO MELISSA SIZE. IF AREA GETS WORSE GO TO ER.    ADVISED TO CONTINUE ABX, COME TOMORROW FOR  NURSE VISIT TO RECHECK CBC AND CHECK WOUND.

## 2024-08-05 DIAGNOSIS — L03.115 CELLULITIS OF RIGHT LOWER EXTREMITY: Primary | ICD-10-CM

## 2024-08-05 RX ORDER — CEPHALEXIN 500 MG/1
500 CAPSULE ORAL 2 TIMES DAILY
Qty: 14 CAPSULE | Refills: 0 | Status: SHIPPED | OUTPATIENT
Start: 2024-08-05 | End: 2024-08-12

## 2024-08-05 ASSESSMENT — ENCOUNTER SYMPTOMS
CONSTIPATION: 0
NAUSEA: 0
COLOR CHANGE: 1
BLOOD IN STOOL: 0
GASTROINTESTINAL NEGATIVE: 1
STRIDOR: 0
CHEST TIGHTNESS: 0
SHORTNESS OF BREATH: 0

## 2024-08-21 ENCOUNTER — OFFICE VISIT (OUTPATIENT)
Facility: CLINIC | Age: 49
End: 2024-08-21
Payer: COMMERCIAL

## 2024-08-21 VITALS
HEART RATE: 101 BPM | OXYGEN SATURATION: 96 % | BODY MASS INDEX: 46.65 KG/M2 | HEIGHT: 69 IN | RESPIRATION RATE: 16 BRPM | WEIGHT: 315 LBS | DIASTOLIC BLOOD PRESSURE: 81 MMHG | SYSTOLIC BLOOD PRESSURE: 132 MMHG | TEMPERATURE: 99.1 F

## 2024-08-21 DIAGNOSIS — Z16.20 THERAPY FAILURE DUE TO ANTIBIOTIC RESISTANCE: ICD-10-CM

## 2024-08-21 DIAGNOSIS — L03.115 CELLULITIS OF RIGHT LOWER EXTREMITY: Primary | ICD-10-CM

## 2024-08-21 DIAGNOSIS — E66.01 CLASS 3 SEVERE OBESITY DUE TO EXCESS CALORIES WITH SERIOUS COMORBIDITY AND BODY MASS INDEX (BMI) OF 50.0 TO 59.9 IN ADULT (HCC): ICD-10-CM

## 2024-08-21 DIAGNOSIS — I10 ESSENTIAL (PRIMARY) HYPERTENSION: ICD-10-CM

## 2024-08-21 PROCEDURE — 3079F DIAST BP 80-89 MM HG: CPT | Performed by: NURSE PRACTITIONER

## 2024-08-21 PROCEDURE — 99214 OFFICE O/P EST MOD 30 MIN: CPT | Performed by: NURSE PRACTITIONER

## 2024-08-21 PROCEDURE — 3075F SYST BP GE 130 - 139MM HG: CPT | Performed by: NURSE PRACTITIONER

## 2024-08-21 RX ORDER — TIRZEPATIDE 2.5 MG/.5ML
2.5 INJECTION, SOLUTION SUBCUTANEOUS WEEKLY
Qty: 3 ML | Refills: 0 | Status: SHIPPED | OUTPATIENT
Start: 2024-08-21 | End: 2024-08-26 | Stop reason: ALTCHOICE

## 2024-08-21 RX ORDER — DOXYCYCLINE 100 MG/1
CAPSULE ORAL
COMMUNITY
Start: 2024-08-12 | End: 2024-08-21 | Stop reason: ALTCHOICE

## 2024-08-21 RX ORDER — MUPIROCIN 20 MG/G
1 OINTMENT TOPICAL 2 TIMES DAILY
Qty: 30 G | Refills: 0 | Status: SHIPPED | OUTPATIENT
Start: 2024-08-21

## 2024-08-21 RX ORDER — LEVOFLOXACIN 750 MG/1
750 TABLET, FILM COATED ORAL DAILY
Qty: 7 TABLET | Refills: 0 | Status: SHIPPED | OUTPATIENT
Start: 2024-08-21 | End: 2024-08-28

## 2024-08-21 RX ORDER — LEVOFLOXACIN 500 MG/1
500 TABLET, FILM COATED ORAL DAILY
COMMUNITY
Start: 2024-08-16 | End: 2024-08-21 | Stop reason: ALTCHOICE

## 2024-08-21 RX ORDER — TIRZEPATIDE 2.5 MG/.5ML
2.5 INJECTION, SOLUTION SUBCUTANEOUS WEEKLY
Qty: 3 ML | Refills: 0 | Status: SHIPPED | OUTPATIENT
Start: 2024-08-21 | End: 2024-08-21 | Stop reason: ALTCHOICE

## 2024-08-21 NOTE — PROGRESS NOTES
\"Have you been to the ER, urgent care clinic since your last visit?  Hospitalized since your last visit?\"    Yes- [facility]  “Have you seen or consulted any other health care providers outside of [facility] since your last visit?”    NO          Click Here for Release of Records Request

## 2024-08-21 NOTE — PROGRESS NOTES
[address] [place] [address]               [phone]      Juan Saleh is a 48 y.o. male and presents with Follow-up (Cellulitis)       Assessment/Plan:    1. Cellulitis of right lower extremity  -     levoFLOXacin (LEVAQUIN) 750 MG tablet; Take 1 tablet by mouth daily for 7 days, Disp-7 tablet, R-0Normal  -     External Referral To Infectious Disease  -     mupirocin (BACTROBAN) 2 % ointment; Apply 1 g topically 2 times daily Apply topically 3 times daily., Topical, 2 TIMES DAILY Starting Wed 8/21/2024, Disp-30 g, R-0, Normal  2. Therapy failure due to antibiotic resistance  -     External Referral To Infectious Disease  3. Class 3 severe obesity due to excess calories with serious comorbidity and body mass index (BMI) of 50.0 to 59.9 in adult ([facility])  4. Essential (primary) hypertension         Follow up and disposition:   No follow-ups on file.      Subjective:    Labs obtained prior to visit? No  Reviewed with patient? N/A    Patient treated with IV antibiotics and doxycyline with improvement   On day 5 levaquin from [facility] after wound culture  --will send 7 more days of levaquin based on wound culture   --will refer to infectious disease      ROS:     Review of Systems   Constitutional:  Negative for appetite change, chills, fatigue and fever.   HENT: Negative.     Respiratory:  Negative for chest tightness, shortness of breath and stridor.    Cardiovascular: Negative.  Negative for chest pain, palpitations and leg swelling.   Gastrointestinal: Negative.  Negative for blood in stool, constipation and nausea.   Genitourinary: Negative.    Musculoskeletal:  Negative for arthralgias.   Skin:  Positive for color change and wound.   Neurological: Negative.    Hematological: Negative.    Psychiatric/Behavioral: Negative.  Negative for sleep disturbance.          The problem list was updated as a part of today's visit.  Patient Active Problem List   Diagnosis    Essential

## 2024-08-26 DIAGNOSIS — K76.0 FATTY (CHANGE OF) LIVER, NOT ELSEWHERE CLASSIFIED: ICD-10-CM

## 2024-08-26 DIAGNOSIS — I10 ESSENTIAL (PRIMARY) HYPERTENSION: ICD-10-CM

## 2024-08-26 DIAGNOSIS — E66.01 CLASS 3 SEVERE OBESITY DUE TO EXCESS CALORIES WITH SERIOUS COMORBIDITY AND BODY MASS INDEX (BMI) OF 50.0 TO 59.9 IN ADULT (HCC): Primary | ICD-10-CM

## 2024-08-30 DIAGNOSIS — L03.115 CELLULITIS OF RIGHT LOWER EXTREMITY: Primary | ICD-10-CM

## 2024-08-30 RX ORDER — CLINDAMYCIN HCL 300 MG
300 CAPSULE ORAL 3 TIMES DAILY
Qty: 30 CAPSULE | Refills: 0 | Status: SHIPPED | OUTPATIENT
Start: 2024-08-30 | End: 2024-09-09

## 2024-08-31 ASSESSMENT — ENCOUNTER SYMPTOMS
COLOR CHANGE: 1
STRIDOR: 0
SHORTNESS OF BREATH: 0
CONSTIPATION: 0
BLOOD IN STOOL: 0
CHEST TIGHTNESS: 0
GASTROINTESTINAL NEGATIVE: 1
NAUSEA: 0

## 2024-09-04 ENCOUNTER — OFFICE VISIT (OUTPATIENT)
Facility: CLINIC | Age: 49
End: 2024-09-04
Payer: COMMERCIAL

## 2024-09-04 ENCOUNTER — HOSPITAL ENCOUNTER (OUTPATIENT)
Facility: HOSPITAL | Age: 49
Setting detail: SPECIMEN
Discharge: HOME | End: 2024-09-07
Payer: COMMERCIAL

## 2024-09-04 VITALS
DIASTOLIC BLOOD PRESSURE: 82 MMHG | BODY MASS INDEX: 46.65 KG/M2 | HEART RATE: 84 BPM | RESPIRATION RATE: 18 BRPM | SYSTOLIC BLOOD PRESSURE: 131 MMHG | WEIGHT: 315 LBS | HEIGHT: 69 IN | OXYGEN SATURATION: 96 % | TEMPERATURE: 98.5 F

## 2024-09-04 DIAGNOSIS — Z87.898 HISTORY OF PREDIABETES: ICD-10-CM

## 2024-09-04 DIAGNOSIS — E55.9 VITAMIN D DEFICIENCY: ICD-10-CM

## 2024-09-04 DIAGNOSIS — Z00.00 ANNUAL PHYSICAL EXAM: ICD-10-CM

## 2024-09-04 DIAGNOSIS — E78.6 LOW HDL (UNDER 40): ICD-10-CM

## 2024-09-04 DIAGNOSIS — E66.01 CLASS 3 SEVERE OBESITY DUE TO EXCESS CALORIES WITH SERIOUS COMORBIDITY AND BODY MASS INDEX (BMI) OF 50.0 TO 59.9 IN ADULT (HCC): ICD-10-CM

## 2024-09-04 DIAGNOSIS — E53.8 B12 DEFICIENCY: ICD-10-CM

## 2024-09-04 DIAGNOSIS — I10 ESSENTIAL (PRIMARY) HYPERTENSION: ICD-10-CM

## 2024-09-04 DIAGNOSIS — Z12.5 SCREENING PSA (PROSTATE SPECIFIC ANTIGEN): ICD-10-CM

## 2024-09-04 DIAGNOSIS — E04.2 MULTIPLE THYROID NODULES: ICD-10-CM

## 2024-09-04 DIAGNOSIS — L03.115 CELLULITIS OF RIGHT LOWER EXTREMITY: ICD-10-CM

## 2024-09-04 DIAGNOSIS — R53.83 OTHER FATIGUE: ICD-10-CM

## 2024-09-04 DIAGNOSIS — Z00.00 ANNUAL PHYSICAL EXAM: Primary | ICD-10-CM

## 2024-09-04 LAB
25(OH)D3 SERPL-MCNC: 78.8 NG/ML (ref 30–100)
ALBUMIN SERPL-MCNC: 4.8 G/DL (ref 3.4–5)
ALBUMIN/GLOB SERPL: 1.4 (ref 0.8–1.7)
ALP SERPL-CCNC: 54 U/L (ref 45–117)
ALT SERPL-CCNC: 41 U/L (ref 16–61)
ANION GAP SERPL CALC-SCNC: 8 MMOL/L (ref 3–18)
APPEARANCE UR: CLEAR
AST SERPL-CCNC: 23 U/L (ref 10–38)
BILIRUB SERPL-MCNC: 0.6 MG/DL (ref 0.2–1)
BILIRUB UR QL: NEGATIVE
BUN SERPL-MCNC: 28 MG/DL (ref 7–18)
BUN/CREAT SERPL: 29 (ref 12–20)
CALCIUM SERPL-MCNC: 9.8 MG/DL (ref 8.5–10.1)
CHLORIDE SERPL-SCNC: 102 MMOL/L (ref 100–111)
CHOLEST SERPL-MCNC: 178 MG/DL
CO2 SERPL-SCNC: 24 MMOL/L (ref 21–32)
COLOR UR: YELLOW
CREAT SERPL-MCNC: 0.98 MG/DL (ref 0.6–1.3)
ERYTHROCYTE [DISTWIDTH] IN BLOOD BY AUTOMATED COUNT: 14.4 % (ref 11.6–14.5)
EST. AVERAGE GLUCOSE BLD GHB EST-MCNC: 117 MG/DL
GLOBULIN SER CALC-MCNC: 3.4 G/DL (ref 2–4)
GLUCOSE SERPL-MCNC: 105 MG/DL (ref 74–99)
GLUCOSE UR STRIP.AUTO-MCNC: NEGATIVE MG/DL
HBA1C MFR BLD: 5.7 % (ref 4.2–5.6)
HCT VFR BLD AUTO: 49.5 % (ref 36–48)
HDLC SERPL-MCNC: 50 MG/DL (ref 40–60)
HDLC SERPL: 3.6 (ref 0–5)
HGB BLD-MCNC: 16.1 G/DL (ref 13–16)
HGB UR QL STRIP: NEGATIVE
KETONES UR QL STRIP.AUTO: NEGATIVE MG/DL
LDLC SERPL CALC-MCNC: 106.6 MG/DL (ref 0–100)
LEUKOCYTE ESTERASE UR QL STRIP.AUTO: NEGATIVE
LIPID PANEL: ABNORMAL
MCH RBC QN AUTO: 27.5 PG (ref 24–34)
MCHC RBC AUTO-ENTMCNC: 32.5 G/DL (ref 31–37)
MCV RBC AUTO: 84.5 FL (ref 78–100)
NITRITE UR QL STRIP.AUTO: NEGATIVE
NRBC # BLD: 0 K/UL (ref 0–0.01)
NRBC BLD-RTO: 0 PER 100 WBC
PH UR STRIP: 5.5 (ref 5–8)
PLATELET # BLD AUTO: 438 K/UL (ref 135–420)
PMV BLD AUTO: 9.5 FL (ref 9.2–11.8)
POTASSIUM SERPL-SCNC: 4.6 MMOL/L (ref 3.5–5.5)
PROT SERPL-MCNC: 8.2 G/DL (ref 6.4–8.2)
PROT UR STRIP-MCNC: NEGATIVE MG/DL
PSA SERPL-MCNC: 0.9 NG/ML (ref 0–4)
RBC # BLD AUTO: 5.86 M/UL (ref 4.35–5.65)
SODIUM SERPL-SCNC: 134 MMOL/L (ref 136–145)
SP GR UR REFRACTOMETRY: 1.02 (ref 1–1.03)
TRIGL SERPL-MCNC: 107 MG/DL
TSH SERPL DL<=0.05 MIU/L-ACNC: 1.7 UIU/ML (ref 0.36–3.74)
UROBILINOGEN UR QL STRIP.AUTO: 0.2 EU/DL (ref 0.2–1)
VIT B12 SERPL-MCNC: >2000 PG/ML (ref 211–911)
VLDLC SERPL CALC-MCNC: 21.4 MG/DL
WBC # BLD AUTO: 11.7 K/UL (ref 4.6–13.2)

## 2024-09-04 PROCEDURE — 3079F DIAST BP 80-89 MM HG: CPT | Performed by: NURSE PRACTITIONER

## 2024-09-04 PROCEDURE — 85027 COMPLETE CBC AUTOMATED: CPT

## 2024-09-04 PROCEDURE — 84402 ASSAY OF FREE TESTOSTERONE: CPT

## 2024-09-04 PROCEDURE — 83036 HEMOGLOBIN GLYCOSYLATED A1C: CPT

## 2024-09-04 PROCEDURE — 84443 ASSAY THYROID STIM HORMONE: CPT

## 2024-09-04 PROCEDURE — 36415 COLL VENOUS BLD VENIPUNCTURE: CPT

## 2024-09-04 PROCEDURE — G0103 PSA SCREENING: HCPCS

## 2024-09-04 PROCEDURE — 82607 VITAMIN B-12: CPT

## 2024-09-04 PROCEDURE — 99396 PREV VISIT EST AGE 40-64: CPT | Performed by: NURSE PRACTITIONER

## 2024-09-04 PROCEDURE — 81003 URINALYSIS AUTO W/O SCOPE: CPT

## 2024-09-04 PROCEDURE — 80061 LIPID PANEL: CPT

## 2024-09-04 PROCEDURE — 84403 ASSAY OF TOTAL TESTOSTERONE: CPT

## 2024-09-04 PROCEDURE — 3075F SYST BP GE 130 - 139MM HG: CPT | Performed by: NURSE PRACTITIONER

## 2024-09-04 PROCEDURE — 82306 VITAMIN D 25 HYDROXY: CPT

## 2024-09-04 PROCEDURE — 80053 COMPREHEN METABOLIC PANEL: CPT

## 2024-09-04 ASSESSMENT — PATIENT HEALTH QUESTIONNAIRE - PHQ9
SUM OF ALL RESPONSES TO PHQ QUESTIONS 1-9: 0
SUM OF ALL RESPONSES TO PHQ9 QUESTIONS 1 & 2: 0
1. LITTLE INTEREST OR PLEASURE IN DOING THINGS: NOT AT ALL
SUM OF ALL RESPONSES TO PHQ QUESTIONS 1-9: 0
2. FEELING DOWN, DEPRESSED OR HOPELESS: NOT AT ALL
SUM OF ALL RESPONSES TO PHQ QUESTIONS 1-9: 0
SUM OF ALL RESPONSES TO PHQ QUESTIONS 1-9: 0

## 2024-09-06 DIAGNOSIS — R79.89 LOW TESTOSTERONE IN MALE: Primary | ICD-10-CM

## 2024-09-06 LAB
TESTOST FREE SERPL-MCNC: 5.4 PG/ML (ref 6.8–21.5)
TESTOST SERPL-MCNC: 225 NG/DL (ref 264–916)

## 2024-09-15 PROBLEM — M19.90 OSTEOARTHROSIS: Status: RESOLVED | Noted: 2023-08-28 | Resolved: 2024-09-15

## 2024-09-15 PROBLEM — Z16.20 THERAPY FAILURE DUE TO ANTIBIOTIC RESISTANCE: Status: RESOLVED | Noted: 2024-08-21 | Resolved: 2024-09-15

## 2024-09-15 PROBLEM — R53.83 OTHER FATIGUE: Status: ACTIVE | Noted: 2024-09-15

## 2024-10-31 DIAGNOSIS — E66.813 CLASS 3 SEVERE OBESITY DUE TO EXCESS CALORIES WITH SERIOUS COMORBIDITY AND BODY MASS INDEX (BMI) OF 50.0 TO 59.9 IN ADULT: ICD-10-CM

## 2024-10-31 DIAGNOSIS — K76.0 FATTY (CHANGE OF) LIVER, NOT ELSEWHERE CLASSIFIED: ICD-10-CM

## 2024-10-31 DIAGNOSIS — E66.01 CLASS 3 SEVERE OBESITY DUE TO EXCESS CALORIES WITH SERIOUS COMORBIDITY AND BODY MASS INDEX (BMI) OF 50.0 TO 59.9 IN ADULT: ICD-10-CM

## 2024-10-31 DIAGNOSIS — I10 ESSENTIAL (PRIMARY) HYPERTENSION: ICD-10-CM

## 2024-11-04 ENCOUNTER — OFFICE VISIT (OUTPATIENT)
Facility: CLINIC | Age: 49
End: 2024-11-04
Payer: COMMERCIAL

## 2024-11-04 VITALS
TEMPERATURE: 98.1 F | BODY MASS INDEX: 46.65 KG/M2 | HEIGHT: 69 IN | RESPIRATION RATE: 16 BRPM | WEIGHT: 315 LBS | DIASTOLIC BLOOD PRESSURE: 74 MMHG | SYSTOLIC BLOOD PRESSURE: 116 MMHG | HEART RATE: 84 BPM | OXYGEN SATURATION: 95 %

## 2024-11-04 DIAGNOSIS — G89.29 CHRONIC PAIN OF RIGHT KNEE: ICD-10-CM

## 2024-11-04 DIAGNOSIS — E66.813 CLASS 3 SEVERE OBESITY DUE TO EXCESS CALORIES WITH SERIOUS COMORBIDITY AND BODY MASS INDEX (BMI) OF 50.0 TO 59.9 IN ADULT: ICD-10-CM

## 2024-11-04 DIAGNOSIS — R73.03 PREDIABETES: ICD-10-CM

## 2024-11-04 DIAGNOSIS — Z01.818 PRE-OP EVALUATION: Primary | ICD-10-CM

## 2024-11-04 DIAGNOSIS — E66.01 CLASS 3 SEVERE OBESITY DUE TO EXCESS CALORIES WITH SERIOUS COMORBIDITY AND BODY MASS INDEX (BMI) OF 50.0 TO 59.9 IN ADULT: ICD-10-CM

## 2024-11-04 DIAGNOSIS — M25.561 CHRONIC PAIN OF RIGHT KNEE: ICD-10-CM

## 2024-11-04 DIAGNOSIS — I10 ESSENTIAL (PRIMARY) HYPERTENSION: ICD-10-CM

## 2024-11-04 PROCEDURE — 99214 OFFICE O/P EST MOD 30 MIN: CPT | Performed by: NURSE PRACTITIONER

## 2024-11-04 PROCEDURE — 3074F SYST BP LT 130 MM HG: CPT | Performed by: NURSE PRACTITIONER

## 2024-11-04 PROCEDURE — 3078F DIAST BP <80 MM HG: CPT | Performed by: NURSE PRACTITIONER

## 2024-11-04 RX ORDER — SYRINGE, DISPOSABLE, 1 ML
SYRINGE, EMPTY DISPOSABLE MISCELLANEOUS
COMMUNITY
Start: 2024-10-28

## 2024-11-04 RX ORDER — AMLODIPINE AND BENAZEPRIL HYDROCHLORIDE 10; 40 MG/1; MG/1
1 CAPSULE ORAL DAILY
Qty: 90 CAPSULE | Refills: 1 | Status: SHIPPED | OUTPATIENT
Start: 2024-11-04

## 2024-11-04 ASSESSMENT — PATIENT HEALTH QUESTIONNAIRE - PHQ9
SUM OF ALL RESPONSES TO PHQ QUESTIONS 1-9: 0
2. FEELING DOWN, DEPRESSED OR HOPELESS: NOT AT ALL
SUM OF ALL RESPONSES TO PHQ9 QUESTIONS 1 & 2: 0
1. LITTLE INTEREST OR PLEASURE IN DOING THINGS: NOT AT ALL
SUM OF ALL RESPONSES TO PHQ QUESTIONS 1-9: 0

## 2024-11-04 NOTE — PROGRESS NOTES
Subjective:      Juan Saleh is a 49 y.o. male who presents to the office today for a preoperative consultation at the request of surgeon Dr. Felipe Hart who plans on performing arthroplasty knee replacement on November 8.  This consultation is requested for the specific conditions prompting preoperative evaluation (i.e. because of potential affect on operative risk): hypertension.  Planned anesthesia is General.  The patient has the following known anesthesia issues:  none   Patient has a bleeding risk of : no recent abnormal bleeding, no remote history of abnormal bleeding, no use of Ca-channel blockers  Patient does not have objection to receiving blood products if needed.  Past Medical History:   Diagnosis Date    Body mass index (BMI) 45.0-49.9, adult 02/27/2023    Caregiver stress 02/27/2023    Diverticulitis     Diverticulitis of colon 10/27/2022    Hypertension     Kidney stone     Obesity     Osteoarthrosis 08/28/2023    Pain in both testicles 08/26/2022    Pain in left knee 02/04/2019    Perforated diverticulum 04/03/2022    Prediabetes 02/27/2023    Spontaneous rupture of other tendons, right upper arm 02/04/2019    Therapy failure due to antibiotic resistance 08/21/2024     Patient Active Problem List    Diagnosis Date Noted    Vitamin D deficiency 02/27/2023    Other fatigue 09/15/2024    Cellulitis of right lower extremity 07/22/2024    History of prediabetes 02/29/2024    Low HDL (under 40) 02/29/2024    Multiple thyroid nodules 12/29/2022    Fatty (change of) liver, not elsewhere classified 08/26/2022    History of cardiac cath 08/26/2022    History of diverticulitis 04/03/2022    Chronic pain of right knee 02/04/2019    Essential (primary) hypertension 06/21/2016    Class 3 severe obesity due to excess calories with body mass index (BMI) of 50.0 to 59.9 in adult 02/01/2016     Past Surgical History:   Procedure Laterality Date    ANTERIOR CRUCIATE LIGAMENT REPAIR  1995    LITHOTRIPSY

## 2024-11-04 NOTE — PROGRESS NOTES
Juan Saleh presents today for   Chief Complaint   Patient presents with    Pre-op Exam       Is someone accompanying this pt? no    Is the patient using any DME equipment during OV? no    Depression Screening:       No data to display                Learning Assessment:  Failed to redirect to the Timeline version of the [employer] SmartLink.    Health Maintenance reviewed and discussed and ordered per Provider.    Health Maintenance Due   Topic Date Due    COVID-19 Vaccine (3 - 2023-24 season) 09/01/2024   .    \"Have you been to the ER, urgent care clinic since your last visit?  Hospitalized since your last visit?\"    No       “Have you seen or consulted any other health care providers outside our system since your last visit?”    Yes

## 2024-11-06 ENCOUNTER — TELEPHONE (OUTPATIENT)
Facility: CLINIC | Age: 49
End: 2024-11-06

## 2024-11-06 NOTE — TELEPHONE ENCOUNTER
Mrs. Singh called and request Mr. Saleh pre-op clearance note and ask if you will please fax that note over.    Fax- [phone]

## 2025-01-29 DIAGNOSIS — E66.813 CLASS 3 SEVERE OBESITY DUE TO EXCESS CALORIES WITH SERIOUS COMORBIDITY AND BODY MASS INDEX (BMI) OF 50.0 TO 59.9 IN ADULT: ICD-10-CM

## 2025-01-29 DIAGNOSIS — E66.01 CLASS 3 SEVERE OBESITY DUE TO EXCESS CALORIES WITH SERIOUS COMORBIDITY AND BODY MASS INDEX (BMI) OF 50.0 TO 59.9 IN ADULT: ICD-10-CM

## 2025-01-29 DIAGNOSIS — I10 ESSENTIAL (PRIMARY) HYPERTENSION: ICD-10-CM

## 2025-01-29 DIAGNOSIS — E04.2 MULTIPLE THYROID NODULES: Primary | ICD-10-CM

## 2025-01-29 DIAGNOSIS — K76.0 FATTY (CHANGE OF) LIVER, NOT ELSEWHERE CLASSIFIED: ICD-10-CM

## 2025-01-29 PROBLEM — Z96.659 S/P KNEE REPLACEMENT: Status: ACTIVE | Noted: 2024-11-08

## 2025-03-05 ENCOUNTER — TELEPHONE (OUTPATIENT)
Facility: CLINIC | Age: 50
End: 2025-03-05

## 2025-04-26 DIAGNOSIS — I10 ESSENTIAL (PRIMARY) HYPERTENSION: ICD-10-CM

## 2025-04-26 RX ORDER — AMLODIPINE AND BENAZEPRIL HYDROCHLORIDE 10; 40 MG/1; MG/1
1 CAPSULE ORAL DAILY
Qty: 90 CAPSULE | Refills: 1 | Status: SHIPPED | OUTPATIENT
Start: 2025-04-26

## 2025-08-18 DIAGNOSIS — I10 ESSENTIAL (PRIMARY) HYPERTENSION: ICD-10-CM

## 2025-08-18 RX ORDER — AMLODIPINE AND BENAZEPRIL HYDROCHLORIDE 10; 40 MG/1; MG/1
1 CAPSULE ORAL DAILY
Qty: 30 CAPSULE | Refills: 0 | Status: SHIPPED | OUTPATIENT
Start: 2025-08-18

## (undated) DEVICE — RADIFOCUS OPTITORQUE ANGIOGRAPHIC CATHETER: Brand: OPTITORQUE

## (undated) DEVICE — SURGICAL PROCEDURE KIT LT HRT CUST

## (undated) DEVICE — GLIDESHEATH SLENDER STAINLESS STEEL KIT: Brand: GLIDESHEATH SLENDER

## (undated) DEVICE — PRESSURE MONITORING SET: Brand: TRUWAVE

## (undated) DEVICE — DRAPE,ANGIO,BRACH,STERILE,38X44: Brand: MEDLINE

## (undated) DEVICE — BAND COMPR L29CM XLN CLR PLAS HEMSTAT EXT HK AND LOOP RETEN

## (undated) DEVICE — SET ANGIO L6.5IN L BOR 3 W STPCOCK SPIK TBNG

## (undated) DEVICE — COVER US PRB W15XL120CM W/ GEL RUBBERBAND TAPE STRP FLD GEN